# Patient Record
Sex: FEMALE | Race: WHITE | NOT HISPANIC OR LATINO | Employment: UNEMPLOYED | URBAN - METROPOLITAN AREA
[De-identification: names, ages, dates, MRNs, and addresses within clinical notes are randomized per-mention and may not be internally consistent; named-entity substitution may affect disease eponyms.]

---

## 2020-07-10 ENCOUNTER — OFFICE VISIT CONVERTED (OUTPATIENT)
Dept: FAMILY MEDICINE CLINIC | Facility: CLINIC | Age: 65
End: 2020-07-10
Attending: NURSE PRACTITIONER

## 2020-07-10 ENCOUNTER — CONVERSION ENCOUNTER (OUTPATIENT)
Dept: FAMILY MEDICINE CLINIC | Facility: CLINIC | Age: 65
End: 2020-07-10

## 2020-07-13 ENCOUNTER — HOSPITAL ENCOUNTER (OUTPATIENT)
Dept: PREADMISSION TESTING | Facility: HOSPITAL | Age: 65
Discharge: HOME | End: 2020-07-13
Attending: SURGERY

## 2020-07-13 ENCOUNTER — OFFICE VISIT CONVERTED (OUTPATIENT)
Dept: SURGERY | Facility: CLINIC | Age: 65
End: 2020-07-13
Attending: SURGERY

## 2020-07-14 ENCOUNTER — HOSPITAL ENCOUNTER (OUTPATIENT)
Dept: PERIOP | Facility: HOSPITAL | Age: 65
Setting detail: HOSPITAL OUTPATIENT SURGERY
Discharge: HOME | End: 2020-07-14
Attending: SURGERY

## 2020-07-14 LAB — SARS-COV-2 RNA SPEC QL NAA+PROBE: NOT DETECTED

## 2020-07-21 ENCOUNTER — OFFICE VISIT CONVERTED (OUTPATIENT)
Dept: ONCOLOGY | Facility: HOSPITAL | Age: 65
End: 2020-07-21
Attending: INTERNAL MEDICINE

## 2020-07-21 ENCOUNTER — HOSPITAL ENCOUNTER (OUTPATIENT)
Dept: ONCOLOGY | Facility: HOSPITAL | Age: 65
Discharge: HOME | End: 2020-07-21
Attending: INTERNAL MEDICINE

## 2020-07-22 ENCOUNTER — OFFICE VISIT CONVERTED (OUTPATIENT)
Dept: SURGERY | Facility: CLINIC | Age: 65
End: 2020-07-22
Attending: SURGERY

## 2020-07-24 ENCOUNTER — HOSPITAL ENCOUNTER (OUTPATIENT)
Dept: PREADMISSION TESTING | Facility: HOSPITAL | Age: 65
Discharge: HOME | End: 2020-07-24
Attending: SURGERY

## 2020-07-24 LAB
ALBUMIN SERPL-MCNC: 3.7 G/DL (ref 3.5–5)
ALBUMIN/GLOB SERPL: 1.3 {RATIO} (ref 1.4–2.6)
ALP SERPL-CCNC: 96 U/L (ref 43–160)
ALT SERPL-CCNC: 35 U/L (ref 10–40)
ANION GAP SERPL CALC-SCNC: 16 MMOL/L (ref 8–19)
AST SERPL-CCNC: 28 U/L (ref 15–50)
BASOPHILS # BLD AUTO: 0.08 10*3/UL (ref 0–0.2)
BASOPHILS NFR BLD AUTO: 0.9 % (ref 0–3)
BILIRUB SERPL-MCNC: 0.43 MG/DL (ref 0.2–1.3)
BUN SERPL-MCNC: 17 MG/DL (ref 5–25)
BUN/CREAT SERPL: 20 {RATIO} (ref 6–20)
CALCIUM SERPL-MCNC: 9.3 MG/DL (ref 8.7–10.4)
CHLORIDE SERPL-SCNC: 97 MMOL/L (ref 99–111)
CONV ABS IMM GRAN: 0.03 10*3/UL (ref 0–0.2)
CONV CO2: 25 MMOL/L (ref 22–32)
CONV IMMATURE GRAN: 0.4 % (ref 0–1.8)
CONV TOTAL PROTEIN: 6.5 G/DL (ref 6.3–8.2)
CREAT UR-MCNC: 0.84 MG/DL (ref 0.5–0.9)
DEPRECATED RDW RBC AUTO: 42.3 FL (ref 36.4–46.3)
EOSINOPHIL # BLD AUTO: 0.15 10*3/UL (ref 0–0.7)
EOSINOPHIL # BLD AUTO: 1.8 % (ref 0–7)
ERYTHROCYTE [DISTWIDTH] IN BLOOD BY AUTOMATED COUNT: 13.5 % (ref 11.7–14.4)
GFR SERPLBLD BASED ON 1.73 SQ M-ARVRAT: >60 ML/MIN/{1.73_M2}
GLOBULIN UR ELPH-MCNC: 2.8 G/DL (ref 2–3.5)
GLUCOSE SERPL-MCNC: 106 MG/DL (ref 65–99)
HCT VFR BLD AUTO: 36.9 % (ref 37–47)
HGB BLD-MCNC: 11.9 G/DL (ref 12–16)
LYMPHOCYTES # BLD AUTO: 1.03 10*3/UL (ref 1–5)
LYMPHOCYTES NFR BLD AUTO: 12.1 % (ref 20–45)
MCH RBC QN AUTO: 27.6 PG (ref 27–31)
MCHC RBC AUTO-ENTMCNC: 32.2 G/DL (ref 33–37)
MCV RBC AUTO: 85.6 FL (ref 81–99)
MONOCYTES # BLD AUTO: 0.79 10*3/UL (ref 0.2–1.2)
MONOCYTES NFR BLD AUTO: 9.3 % (ref 3–10)
NEUTROPHILS # BLD AUTO: 6.44 10*3/UL (ref 2–8)
NEUTROPHILS NFR BLD AUTO: 75.5 % (ref 30–85)
NRBC CBCN: 0 % (ref 0–0.7)
OSMOLALITY SERPL CALC.SUM OF ELEC: 280 MOSM/KG (ref 273–304)
PLATELET # BLD AUTO: 494 10*3/UL (ref 130–400)
PMV BLD AUTO: 8.8 FL (ref 9.4–12.3)
POTASSIUM SERPL-SCNC: 4.3 MMOL/L (ref 3.5–5.3)
RBC # BLD AUTO: 4.31 10*6/UL (ref 4.2–5.4)
SODIUM SERPL-SCNC: 134 MMOL/L (ref 135–147)
WBC # BLD AUTO: 8.52 10*3/UL (ref 4.8–10.8)

## 2020-07-26 LAB — SARS-COV-2 RNA SPEC QL NAA+PROBE: NOT DETECTED

## 2020-07-27 ENCOUNTER — HOSPITAL ENCOUNTER (OUTPATIENT)
Dept: RADIATION ONCOLOGY | Facility: HOSPITAL | Age: 65
Setting detail: RECURRING SERIES
Discharge: STILL ADMITTED OR ADMINISTRATIVE | End: 2020-07-27
Attending: RADIOLOGY

## 2020-08-06 ENCOUNTER — OFFICE VISIT CONVERTED (OUTPATIENT)
Dept: SURGERY | Facility: CLINIC | Age: 65
End: 2020-08-06
Attending: SURGERY

## 2020-08-07 ENCOUNTER — HOSPITAL ENCOUNTER (OUTPATIENT)
Dept: MRI IMAGING | Facility: HOSPITAL | Age: 65
Discharge: HOME | End: 2020-08-07
Attending: INTERNAL MEDICINE

## 2020-08-20 ENCOUNTER — OFFICE VISIT CONVERTED (OUTPATIENT)
Dept: ONCOLOGY | Facility: HOSPITAL | Age: 65
End: 2020-08-20
Attending: INTERNAL MEDICINE

## 2020-08-20 ENCOUNTER — HOSPITAL ENCOUNTER (OUTPATIENT)
Dept: OTHER | Facility: HOSPITAL | Age: 65
Discharge: HOME | End: 2020-08-20
Attending: INTERNAL MEDICINE

## 2020-08-26 ENCOUNTER — CONVERSION ENCOUNTER (OUTPATIENT)
Dept: SURGERY | Facility: CLINIC | Age: 65
End: 2020-08-26

## 2020-08-26 ENCOUNTER — OFFICE VISIT CONVERTED (OUTPATIENT)
Dept: SURGERY | Facility: CLINIC | Age: 65
End: 2020-08-26
Attending: SURGERY

## 2020-08-28 ENCOUNTER — HOSPITAL ENCOUNTER (OUTPATIENT)
Dept: ONCOLOGY | Facility: HOSPITAL | Age: 65
Discharge: HOME | End: 2020-08-28
Attending: NURSE PRACTITIONER

## 2020-08-28 ENCOUNTER — OFFICE VISIT CONVERTED (OUTPATIENT)
Dept: ONCOLOGY | Facility: HOSPITAL | Age: 65
End: 2020-08-28
Attending: NURSE PRACTITIONER

## 2020-08-31 ENCOUNTER — OFFICE VISIT CONVERTED (OUTPATIENT)
Dept: ONCOLOGY | Facility: HOSPITAL | Age: 65
End: 2020-08-31
Attending: INTERNAL MEDICINE

## 2020-08-31 ENCOUNTER — HOSPITAL ENCOUNTER (OUTPATIENT)
Dept: OTHER | Facility: HOSPITAL | Age: 65
Setting detail: RECURRING SERIES
Discharge: HOME | End: 2020-11-29
Attending: INTERNAL MEDICINE

## 2020-08-31 LAB
ALBUMIN SERPL-MCNC: 2.8 G/DL (ref 3.5–5)
ALBUMIN/GLOB SERPL: 1.2 {RATIO} (ref 1.4–2.6)
ALP SERPL-CCNC: 81 U/L (ref 43–160)
ALT SERPL-CCNC: 36 U/L (ref 10–40)
ANION GAP SERPL CALC-SCNC: 12 MMOL/L (ref 8–19)
AST SERPL-CCNC: 28 U/L (ref 15–50)
BASOPHILS # BLD AUTO: 0.04 10*3/UL (ref 0–0.2)
BASOPHILS NFR BLD AUTO: 0.3 % (ref 0–3)
BILIRUB SERPL-MCNC: 0.39 MG/DL (ref 0.2–1.3)
BUN SERPL-MCNC: 24 MG/DL (ref 5–25)
BUN/CREAT SERPL: 38 {RATIO} (ref 6–20)
CALCIUM SERPL-MCNC: 9.2 MG/DL (ref 8.7–10.4)
CHLORIDE SERPL-SCNC: 100 MMOL/L (ref 99–111)
CONV ABS IMM GRAN: 0.02 10*3/UL (ref 0–0.54)
CONV CO2: 26 MMOL/L (ref 22–32)
CONV EOSINOPHILS PERCENT BY MANUAL COUNT: 0.3 % (ref 0–7)
CONV IMMATURE GRAN: 0.2 % (ref 0–0.4)
CONV TOTAL PROTEIN: 5.1 G/DL (ref 6.3–8.2)
CREAT UR-MCNC: 0.63 MG/DL (ref 0.5–0.9)
EOSINOPHIL # BLD MANUAL: 0.04 10*3/UL (ref 0–0.7)
ERYTHROCYTE [DISTWIDTH] IN BLOOD BY AUTOMATED COUNT: 14.6 % (ref 11.5–14.5)
ERYTHROCYTE [DISTWIDTH] IN BLOOD BY AUTOMATED COUNT: 45 FL
FERRITIN SERPL-MCNC: 56 NG/ML (ref 10–200)
GFR SERPLBLD BASED ON 1.73 SQ M-ARVRAT: >60 ML/MIN/{1.73_M2}
GLOBULIN UR ELPH-MCNC: 2.3 G/DL (ref 2–3.5)
GLUCOSE SERPL-MCNC: 128 MG/DL (ref 65–99)
HBA1C MFR BLD: 9.5 G/DL (ref 12–16)
HCT VFR BLD AUTO: 29.1 % (ref 37–47)
IRON SATN MFR SERPL: 8 % (ref 20–55)
IRON SERPL-MCNC: 24 UG/DL (ref 60–170)
LYMPHOCYTES # BLD AUTO: 1.05 10*3/UL (ref 1–5)
LYMPHOCYTES NFR BLD AUTO: 8.7 % (ref 20–45)
MAGNESIUM SERPL-MCNC: 2.17 MG/DL (ref 1.6–2.3)
MCH RBC QN AUTO: 27.6 PG (ref 27–31)
MCHC RBC AUTO-ENTMCNC: 32.6 G/DL (ref 33–37)
MCV RBC AUTO: 84.6 FL (ref 81–99)
MONOCYTES # BLD AUTO: 0.91 10*3/UL (ref 0.2–1.2)
MONOCYTES NFR BLD MANUAL: 7.6 % (ref 3–10)
NEUTROPHILS # BLD AUTO: 9.96 10*3/UL (ref 2–8)
NEUTROPHILS NFR BLD MANUAL: 82.9 % (ref 30–85)
OSMOLALITY SERPL CALC.SUM OF ELEC: 284 MOSM/KG (ref 273–304)
PLATELET # BLD AUTO: 565 10*3/UL (ref 130–400)
PMV BLD AUTO: 8.1 FL (ref 7.4–10.4)
POTASSIUM SERPL-SCNC: 4 MMOL/L (ref 3.5–5.3)
RBC MORPH BLD: 3.44 10*6/UL (ref 4.2–5.4)
SODIUM SERPL-SCNC: 134 MMOL/L (ref 135–147)
TIBC SERPL-MCNC: 283 UG/DL (ref 245–450)
TRANSFERRIN SERPL-MCNC: 198 MG/DL (ref 250–380)
WBC # BLD AUTO: 12.02 10*3/UL (ref 4.8–10.8)

## 2020-09-14 ENCOUNTER — OFFICE VISIT CONVERTED (OUTPATIENT)
Dept: ONCOLOGY | Facility: HOSPITAL | Age: 65
End: 2020-09-14
Attending: INTERNAL MEDICINE

## 2020-09-14 LAB
ALBUMIN SERPL-MCNC: 2.7 G/DL (ref 3.5–5)
ALBUMIN/GLOB SERPL: 1.4 {RATIO} (ref 1.4–2.6)
ALP SERPL-CCNC: 126 U/L (ref 43–160)
ALT SERPL-CCNC: 22 U/L (ref 10–40)
ANION GAP SERPL CALC-SCNC: 8 MMOL/L (ref 8–19)
AST SERPL-CCNC: 19 U/L (ref 15–50)
BASOPHILS # BLD AUTO: 0.1 10*3/UL (ref 0–0.2)
BASOPHILS NFR BLD AUTO: 0.5 % (ref 0–3)
BILIRUB SERPL-MCNC: <0.15 MG/DL (ref 0.2–1.3)
BUN SERPL-MCNC: 17 MG/DL (ref 5–25)
BUN/CREAT SERPL: 31 {RATIO} (ref 6–20)
CALCIUM SERPL-MCNC: 8.6 MG/DL (ref 8.7–10.4)
CHLORIDE SERPL-SCNC: 106 MMOL/L (ref 99–111)
CONV ABS IMM GRAN: 1.29 10*3/UL (ref 0–0.54)
CONV CO2: 27 MMOL/L (ref 22–32)
CONV EOSINOPHILS PERCENT BY MANUAL COUNT: 0.2 % (ref 0–7)
CONV IMMATURE GRAN: 6.6 % (ref 0–0.4)
CONV TOTAL PROTEIN: 4.7 G/DL (ref 6.3–8.2)
CREAT UR-MCNC: 0.55 MG/DL (ref 0.5–0.9)
EOSINOPHIL # BLD MANUAL: 0.03 10*3/UL (ref 0–0.7)
ERYTHROCYTE [DISTWIDTH] IN BLOOD BY AUTOMATED COUNT: 20.3 % (ref 11.5–14.5)
ERYTHROCYTE [DISTWIDTH] IN BLOOD BY AUTOMATED COUNT: 53.6 FL
GFR SERPLBLD BASED ON 1.73 SQ M-ARVRAT: >60 ML/MIN/{1.73_M2}
GLOBULIN UR ELPH-MCNC: 2 G/DL (ref 2–3.5)
GLUCOSE SERPL-MCNC: 99 MG/DL (ref 65–99)
HBA1C MFR BLD: 9.7 G/DL (ref 12–16)
HCT VFR BLD AUTO: 30.6 % (ref 37–47)
LYMPHOCYTES # BLD AUTO: 1.79 10*3/UL (ref 1–5)
LYMPHOCYTES NFR BLD AUTO: 9.2 % (ref 20–45)
MAGNESIUM SERPL-MCNC: 2.17 MG/DL (ref 1.6–2.3)
MCH RBC QN AUTO: 28.9 PG (ref 27–31)
MCHC RBC AUTO-ENTMCNC: 31.7 G/DL (ref 33–37)
MCV RBC AUTO: 91.1 FL (ref 81–99)
MONOCYTES # BLD AUTO: 0.82 10*3/UL (ref 0.2–1.2)
MONOCYTES NFR BLD MANUAL: 4.2 % (ref 3–10)
NEUTROPHILS # BLD AUTO: 15.39 10*3/UL (ref 2–8)
NEUTROPHILS NFR BLD MANUAL: 79.3 % (ref 30–85)
OSMOLALITY SERPL CALC.SUM OF ELEC: 286 MOSM/KG (ref 273–304)
PATHOLOGY REVIEW: NORMAL
PLATELET # BLD AUTO: 348 10*3/UL (ref 130–400)
PMV BLD AUTO: 8.5 FL (ref 7.4–10.4)
POTASSIUM SERPL-SCNC: 3.8 MMOL/L (ref 3.5–5.3)
RBC MORPH BLD: 3.36 10*6/UL (ref 4.2–5.4)
SODIUM SERPL-SCNC: 137 MMOL/L (ref 135–147)
WBC # BLD AUTO: 19.42 10*3/UL (ref 4.8–10.8)

## 2020-09-28 ENCOUNTER — OFFICE VISIT CONVERTED (OUTPATIENT)
Dept: ONCOLOGY | Facility: HOSPITAL | Age: 65
End: 2020-09-28
Attending: INTERNAL MEDICINE

## 2020-09-28 LAB
ALBUMIN SERPL-MCNC: 3.3 G/DL (ref 3.5–5)
ALBUMIN/GLOB SERPL: 1.6 {RATIO} (ref 1.4–2.6)
ALP SERPL-CCNC: 143 U/L (ref 43–160)
ALT SERPL-CCNC: 38 U/L (ref 10–40)
ANION GAP SERPL CALC-SCNC: 13 MMOL/L (ref 8–19)
AST SERPL-CCNC: 32 U/L (ref 15–50)
BASOPHILS # BLD AUTO: 0.1 10*3/UL (ref 0–0.2)
BASOPHILS NFR BLD AUTO: 0.7 % (ref 0–3)
BILIRUB SERPL-MCNC: <0.15 MG/DL (ref 0.2–1.3)
BUN SERPL-MCNC: 14 MG/DL (ref 5–25)
BUN/CREAT SERPL: 23 {RATIO} (ref 6–20)
CALCIUM SERPL-MCNC: 9 MG/DL (ref 8.7–10.4)
CHLORIDE SERPL-SCNC: 105 MMOL/L (ref 99–111)
CONV ABS IMM GRAN: 0.77 10*3/UL (ref 0–0.54)
CONV CO2: 25 MMOL/L (ref 22–32)
CONV EOSINOPHILS PERCENT BY MANUAL COUNT: 0.3 % (ref 0–7)
CONV IMMATURE GRAN: 5.6 % (ref 0–0.4)
CONV TOTAL PROTEIN: 5.4 G/DL (ref 6.3–8.2)
CREAT UR-MCNC: 0.61 MG/DL (ref 0.5–0.9)
EOSINOPHIL # BLD MANUAL: 0.04 10*3/UL (ref 0–0.7)
ERYTHROCYTE [DISTWIDTH] IN BLOOD BY AUTOMATED COUNT: 23.6 % (ref 11.5–14.5)
ERYTHROCYTE [DISTWIDTH] IN BLOOD BY AUTOMATED COUNT: 77.6 FL
GFR SERPLBLD BASED ON 1.73 SQ M-ARVRAT: >60 ML/MIN/{1.73_M2}
GLOBULIN UR ELPH-MCNC: 2.1 G/DL (ref 2–3.5)
GLUCOSE SERPL-MCNC: 109 MG/DL (ref 65–99)
HBA1C MFR BLD: 10.6 G/DL (ref 12–16)
HCT VFR BLD AUTO: 34.8 % (ref 37–47)
LYMPHOCYTES # BLD AUTO: 1.4 10*3/UL (ref 1–5)
LYMPHOCYTES NFR BLD AUTO: 10.2 % (ref 20–45)
MAGNESIUM SERPL-MCNC: 2.31 MG/DL (ref 1.6–2.3)
MCH RBC QN AUTO: 29.3 PG (ref 27–31)
MCHC RBC AUTO-ENTMCNC: 30.5 G/DL (ref 33–37)
MCV RBC AUTO: 96.1 FL (ref 81–99)
MONOCYTES # BLD AUTO: 0.66 10*3/UL (ref 0.2–1.2)
MONOCYTES NFR BLD MANUAL: 4.8 % (ref 3–10)
NEUTROPHILS # BLD AUTO: 10.76 10*3/UL (ref 2–8)
NEUTROPHILS NFR BLD MANUAL: 78.4 % (ref 30–85)
OSMOLALITY SERPL CALC.SUM OF ELEC: 289 MOSM/KG (ref 273–304)
PLATELET # BLD AUTO: 302 10*3/UL (ref 130–400)
PMV BLD AUTO: 8.8 FL (ref 7.4–10.4)
POTASSIUM SERPL-SCNC: 4 MMOL/L (ref 3.5–5.3)
RBC MORPH BLD: 3.62 10*6/UL (ref 4.2–5.4)
SODIUM SERPL-SCNC: 139 MMOL/L (ref 135–147)
WBC # BLD AUTO: 13.73 10*3/UL (ref 4.8–10.8)

## 2020-10-12 ENCOUNTER — OFFICE VISIT CONVERTED (OUTPATIENT)
Dept: ONCOLOGY | Facility: HOSPITAL | Age: 65
End: 2020-10-12
Attending: INTERNAL MEDICINE

## 2020-10-12 LAB
ALBUMIN SERPL-MCNC: 3.4 G/DL (ref 3.5–5)
ALBUMIN/GLOB SERPL: 1.6 {RATIO} (ref 1.4–2.6)
ALP SERPL-CCNC: 132 U/L (ref 43–160)
ALT SERPL-CCNC: 45 U/L (ref 10–40)
ANION GAP SERPL CALC-SCNC: 12 MMOL/L (ref 8–19)
AST SERPL-CCNC: 41 U/L (ref 15–50)
BASOPHILS # BLD AUTO: 0.05 10*3/UL (ref 0–0.2)
BASOPHILS NFR BLD AUTO: 0.4 % (ref 0–3)
BILIRUB SERPL-MCNC: <0.15 MG/DL (ref 0.2–1.3)
BUN SERPL-MCNC: 17 MG/DL (ref 5–25)
BUN/CREAT SERPL: 31 {RATIO} (ref 6–20)
CALCIUM SERPL-MCNC: 9.5 MG/DL (ref 8.7–10.4)
CHLORIDE SERPL-SCNC: 106 MMOL/L (ref 99–111)
CONV ABS IMM GRAN: 0.24 10*3/UL (ref 0–0.54)
CONV CO2: 27 MMOL/L (ref 22–32)
CONV EOSINOPHILS PERCENT BY MANUAL COUNT: 0.6 % (ref 0–7)
CONV IMMATURE GRAN: 1.9 % (ref 0–0.4)
CONV TOTAL PROTEIN: 5.5 G/DL (ref 6.3–8.2)
CREAT UR-MCNC: 0.54 MG/DL (ref 0.5–0.9)
EOSINOPHIL # BLD MANUAL: 0.08 10*3/UL (ref 0–0.7)
ERYTHROCYTE [DISTWIDTH] IN BLOOD BY AUTOMATED COUNT: 22.7 % (ref 11.5–14.5)
ERYTHROCYTE [DISTWIDTH] IN BLOOD BY AUTOMATED COUNT: 77.1 FL
GFR SERPLBLD BASED ON 1.73 SQ M-ARVRAT: >60 ML/MIN/{1.73_M2}
GLOBULIN UR ELPH-MCNC: 2.1 G/DL (ref 2–3.5)
GLUCOSE SERPL-MCNC: 105 MG/DL (ref 65–99)
HBA1C MFR BLD: 11.2 G/DL (ref 12–16)
HCT VFR BLD AUTO: 35.2 % (ref 37–47)
LYMPHOCYTES # BLD AUTO: 1.24 10*3/UL (ref 1–5)
LYMPHOCYTES NFR BLD AUTO: 9.6 % (ref 20–45)
MAGNESIUM SERPL-MCNC: 2.17 MG/DL (ref 1.6–2.3)
MCH RBC QN AUTO: 31.2 PG (ref 27–31)
MCHC RBC AUTO-ENTMCNC: 31.8 G/DL (ref 33–37)
MCV RBC AUTO: 98.1 FL (ref 81–99)
MONOCYTES # BLD AUTO: 0.73 10*3/UL (ref 0.2–1.2)
MONOCYTES NFR BLD MANUAL: 5.7 % (ref 3–10)
NEUTROPHILS # BLD AUTO: 10.51 10*3/UL (ref 2–8)
NEUTROPHILS NFR BLD MANUAL: 81.8 % (ref 30–85)
OSMOLALITY SERPL CALC.SUM OF ELEC: 294 MOSM/KG (ref 273–304)
PLATELET # BLD AUTO: 267 10*3/UL (ref 130–400)
PMV BLD AUTO: 9.6 FL (ref 7.4–10.4)
POTASSIUM SERPL-SCNC: 3.7 MMOL/L (ref 3.5–5.3)
RBC MORPH BLD: 3.59 10*6/UL (ref 4.2–5.4)
SODIUM SERPL-SCNC: 141 MMOL/L (ref 135–147)
WBC # BLD AUTO: 12.85 10*3/UL (ref 4.8–10.8)

## 2020-10-26 ENCOUNTER — OFFICE VISIT CONVERTED (OUTPATIENT)
Dept: ONCOLOGY | Facility: HOSPITAL | Age: 65
End: 2020-10-26
Attending: INTERNAL MEDICINE

## 2020-10-26 LAB
ALBUMIN SERPL-MCNC: 3.8 G/DL (ref 3.5–5)
ALBUMIN/GLOB SERPL: 1.7 {RATIO} (ref 1.4–2.6)
ALP SERPL-CCNC: 158 U/L (ref 43–160)
ALT SERPL-CCNC: 94 U/L (ref 10–40)
ANION GAP SERPL CALC-SCNC: 14 MMOL/L (ref 8–19)
AST SERPL-CCNC: 92 U/L (ref 15–50)
BASOPHILS # BLD AUTO: 0.04 10*3/UL (ref 0–0.2)
BASOPHILS NFR BLD AUTO: 0.3 % (ref 0–3)
BILIRUB SERPL-MCNC: 0.18 MG/DL (ref 0.2–1.3)
BUN SERPL-MCNC: 19 MG/DL (ref 5–25)
BUN/CREAT SERPL: 35 {RATIO} (ref 6–20)
CALCIUM SERPL-MCNC: 9.5 MG/DL (ref 8.7–10.4)
CHLORIDE SERPL-SCNC: 103 MMOL/L (ref 99–111)
CONV ABS IMM GRAN: 0.58 10*3/UL (ref 0–0.54)
CONV CO2: 29 MMOL/L (ref 22–32)
CONV EOSINOPHILS PERCENT BY MANUAL COUNT: 0.9 % (ref 0–7)
CONV IMMATURE GRAN: 3.9 % (ref 0–0.4)
CONV TOTAL PROTEIN: 6 G/DL (ref 6.3–8.2)
CREAT UR-MCNC: 0.54 MG/DL (ref 0.5–0.9)
EOSINOPHIL # BLD MANUAL: 0.14 10*3/UL (ref 0–0.7)
ERYTHROCYTE [DISTWIDTH] IN BLOOD BY AUTOMATED COUNT: 20.9 % (ref 11.5–14.5)
ERYTHROCYTE [DISTWIDTH] IN BLOOD BY AUTOMATED COUNT: 73.8 FL
FERRITIN SERPL-MCNC: 849 NG/ML (ref 10–200)
GFR SERPLBLD BASED ON 1.73 SQ M-ARVRAT: >60 ML/MIN/{1.73_M2}
GLOBULIN UR ELPH-MCNC: 2.2 G/DL (ref 2–3.5)
GLUCOSE SERPL-MCNC: 103 MG/DL (ref 65–99)
HBA1C MFR BLD: 12 G/DL (ref 12–16)
HCT VFR BLD AUTO: 37.7 % (ref 37–47)
IRON SATN MFR SERPL: 21 % (ref 20–55)
IRON SERPL-MCNC: 66 UG/DL (ref 60–170)
LYMPHOCYTES # BLD AUTO: 1.47 10*3/UL (ref 1–5)
LYMPHOCYTES NFR BLD AUTO: 9.8 % (ref 20–45)
MAGNESIUM SERPL-MCNC: 2.26 MG/DL (ref 1.6–2.3)
MCH RBC QN AUTO: 31.4 PG (ref 27–31)
MCHC RBC AUTO-ENTMCNC: 31.8 G/DL (ref 33–37)
MCV RBC AUTO: 98.7 FL (ref 81–99)
MONOCYTES # BLD AUTO: 0.87 10*3/UL (ref 0.2–1.2)
MONOCYTES NFR BLD MANUAL: 5.8 % (ref 3–10)
NEUTROPHILS # BLD AUTO: 11.92 10*3/UL (ref 2–8)
NEUTROPHILS NFR BLD MANUAL: 79.3 % (ref 30–85)
OSMOLALITY SERPL CALC.SUM OF ELEC: 297 MOSM/KG (ref 273–304)
PLATELET # BLD AUTO: 202 10*3/UL (ref 130–400)
PMV BLD AUTO: 10 FL (ref 7.4–10.4)
POTASSIUM SERPL-SCNC: 3.8 MMOL/L (ref 3.5–5.3)
RBC MORPH BLD: 3.82 10*6/UL (ref 4.2–5.4)
SODIUM SERPL-SCNC: 142 MMOL/L (ref 135–147)
TIBC SERPL-MCNC: 320 UG/DL (ref 245–450)
TRANSFERRIN SERPL-MCNC: 224 MG/DL (ref 250–380)
WBC # BLD AUTO: 15.02 10*3/UL (ref 4.8–10.8)

## 2020-11-02 ENCOUNTER — HOSPITAL ENCOUNTER (OUTPATIENT)
Dept: GENERAL RADIOLOGY | Facility: HOSPITAL | Age: 65
Discharge: HOME | End: 2020-11-02
Attending: INTERNAL MEDICINE

## 2020-11-09 ENCOUNTER — OFFICE VISIT CONVERTED (OUTPATIENT)
Dept: ONCOLOGY | Facility: HOSPITAL | Age: 65
End: 2020-11-09
Attending: INTERNAL MEDICINE

## 2020-11-09 LAB
ALBUMIN SERPL-MCNC: 3.9 G/DL (ref 3.5–5)
ALBUMIN/GLOB SERPL: 1.9 {RATIO} (ref 1.4–2.6)
ALP SERPL-CCNC: 169 U/L (ref 43–160)
ALT SERPL-CCNC: 74 U/L (ref 10–40)
ANION GAP SERPL CALC-SCNC: 15 MMOL/L (ref 8–19)
AST SERPL-CCNC: 69 U/L (ref 15–50)
BASOPHILS # BLD AUTO: 0.05 10*3/UL (ref 0–0.2)
BASOPHILS NFR BLD AUTO: 0.4 % (ref 0–3)
BILIRUB SERPL-MCNC: 0.23 MG/DL (ref 0.2–1.3)
BUN SERPL-MCNC: 19 MG/DL (ref 5–25)
BUN/CREAT SERPL: 29 {RATIO} (ref 6–20)
CALCIUM SERPL-MCNC: 9.4 MG/DL (ref 8.7–10.4)
CHLORIDE SERPL-SCNC: 101 MMOL/L (ref 99–111)
CONV ABS IMM GRAN: 0.67 10*3/UL (ref 0–0.54)
CONV CO2: 29 MMOL/L (ref 22–32)
CONV EOSINOPHILS PERCENT BY MANUAL COUNT: 0.8 % (ref 0–7)
CONV IMMATURE GRAN: 5.3 % (ref 0–0.4)
CONV TOTAL PROTEIN: 6 G/DL (ref 6.3–8.2)
CREAT UR-MCNC: 0.66 MG/DL (ref 0.5–0.9)
EOSINOPHIL # BLD MANUAL: 0.1 10*3/UL (ref 0–0.7)
ERYTHROCYTE [DISTWIDTH] IN BLOOD BY AUTOMATED COUNT: 19.2 % (ref 11.5–14.5)
ERYTHROCYTE [DISTWIDTH] IN BLOOD BY AUTOMATED COUNT: 68.4 FL
GFR SERPLBLD BASED ON 1.73 SQ M-ARVRAT: >60 ML/MIN/{1.73_M2}
GLOBULIN UR ELPH-MCNC: 2.1 G/DL (ref 2–3.5)
GLUCOSE SERPL-MCNC: 93 MG/DL (ref 65–99)
HBA1C MFR BLD: 12.7 G/DL (ref 12–16)
HCT VFR BLD AUTO: 39.5 % (ref 37–47)
LYMPHOCYTES # BLD AUTO: 1.27 10*3/UL (ref 1–5)
LYMPHOCYTES NFR BLD AUTO: 10 % (ref 20–45)
MAGNESIUM SERPL-MCNC: 2.3 MG/DL (ref 1.6–2.3)
MCH RBC QN AUTO: 31.7 PG (ref 27–31)
MCHC RBC AUTO-ENTMCNC: 32.2 G/DL (ref 33–37)
MCV RBC AUTO: 98.5 FL (ref 81–99)
MONOCYTES # BLD AUTO: 0.87 10*3/UL (ref 0.2–1.2)
MONOCYTES NFR BLD MANUAL: 6.8 % (ref 3–10)
NEUTROPHILS # BLD AUTO: 9.77 10*3/UL (ref 2–8)
NEUTROPHILS NFR BLD MANUAL: 76.7 % (ref 30–85)
OSMOLALITY SERPL CALC.SUM OF ELEC: 294 MOSM/KG (ref 273–304)
PLATELET # BLD AUTO: 181 10*3/UL (ref 130–400)
PMV BLD AUTO: 10.1 FL (ref 7.4–10.4)
POTASSIUM SERPL-SCNC: 4 MMOL/L (ref 3.5–5.3)
RBC MORPH BLD: 4.01 10*6/UL (ref 4.2–5.4)
SODIUM SERPL-SCNC: 141 MMOL/L (ref 135–147)
WBC # BLD AUTO: 12.73 10*3/UL (ref 4.8–10.8)

## 2020-11-23 ENCOUNTER — OFFICE VISIT CONVERTED (OUTPATIENT)
Dept: ONCOLOGY | Facility: HOSPITAL | Age: 65
End: 2020-11-23
Attending: INTERNAL MEDICINE

## 2020-11-23 LAB
ALBUMIN SERPL-MCNC: 3.9 G/DL (ref 3.5–5)
ALBUMIN/GLOB SERPL: 1.8 {RATIO} (ref 1.4–2.6)
ALP SERPL-CCNC: 282 U/L (ref 43–160)
ALT SERPL-CCNC: 387 U/L (ref 10–40)
ANION GAP SERPL CALC-SCNC: 11 MMOL/L (ref 8–19)
AST SERPL-CCNC: 211 U/L (ref 15–50)
BASOPHILS # BLD AUTO: 0.03 10*3/UL (ref 0–0.2)
BASOPHILS NFR BLD AUTO: 0.2 % (ref 0–3)
BILIRUB SERPL-MCNC: 0.27 MG/DL (ref 0.2–1.3)
BUN SERPL-MCNC: 16 MG/DL (ref 5–25)
BUN/CREAT SERPL: 26 {RATIO} (ref 6–20)
CALCIUM SERPL-MCNC: 9.4 MG/DL (ref 8.7–10.4)
CHLORIDE SERPL-SCNC: 104 MMOL/L (ref 99–111)
CONV ABS IMM GRAN: 0.72 10*3/UL (ref 0–0.54)
CONV CO2: 30 MMOL/L (ref 22–32)
CONV EOSINOPHILS PERCENT BY MANUAL COUNT: 0.9 % (ref 0–7)
CONV IMMATURE GRAN: 4.9 % (ref 0–0.4)
CONV TOTAL PROTEIN: 6.1 G/DL (ref 6.3–8.2)
CREAT UR-MCNC: 0.62 MG/DL (ref 0.5–0.9)
EOSINOPHIL # BLD MANUAL: 0.13 10*3/UL (ref 0–0.7)
ERYTHROCYTE [DISTWIDTH] IN BLOOD BY AUTOMATED COUNT: 17.9 % (ref 11.5–14.5)
ERYTHROCYTE [DISTWIDTH] IN BLOOD BY AUTOMATED COUNT: 65 FL
GFR SERPLBLD BASED ON 1.73 SQ M-ARVRAT: >60 ML/MIN/{1.73_M2}
GLOBULIN UR ELPH-MCNC: 2.2 G/DL (ref 2–3.5)
GLUCOSE SERPL-MCNC: 94 MG/DL (ref 65–99)
HBA1C MFR BLD: 12.5 G/DL (ref 12–16)
HCT VFR BLD AUTO: 39 % (ref 37–47)
LYMPHOCYTES # BLD AUTO: 1.61 10*3/UL (ref 1–5)
LYMPHOCYTES NFR BLD AUTO: 10.9 % (ref 20–45)
MAGNESIUM SERPL-MCNC: 2.25 MG/DL (ref 1.6–2.3)
MCH RBC QN AUTO: 31.6 PG (ref 27–31)
MCHC RBC AUTO-ENTMCNC: 32.1 G/DL (ref 33–37)
MCV RBC AUTO: 98.7 FL (ref 81–99)
MONOCYTES # BLD AUTO: 0.99 10*3/UL (ref 0.2–1.2)
MONOCYTES NFR BLD MANUAL: 6.7 % (ref 3–10)
NEUTROPHILS # BLD AUTO: 11.3 10*3/UL (ref 2–8)
NEUTROPHILS NFR BLD MANUAL: 76.4 % (ref 30–85)
OSMOLALITY SERPL CALC.SUM OF ELEC: 293 MOSM/KG (ref 273–304)
PLATELET # BLD AUTO: 117 10*3/UL (ref 130–400)
PMV BLD AUTO: 10.5 FL (ref 7.4–10.4)
POTASSIUM SERPL-SCNC: 4 MMOL/L (ref 3.5–5.3)
RBC MORPH BLD: 3.95 10*6/UL (ref 4.2–5.4)
SODIUM SERPL-SCNC: 141 MMOL/L (ref 135–147)
WBC # BLD AUTO: 14.78 10*3/UL (ref 4.8–10.8)

## 2020-11-30 ENCOUNTER — OFFICE VISIT CONVERTED (OUTPATIENT)
Dept: SURGERY | Facility: CLINIC | Age: 65
End: 2020-11-30
Attending: SURGERY

## 2020-12-03 ENCOUNTER — HOSPITAL ENCOUNTER (OUTPATIENT)
Dept: RADIATION ONCOLOGY | Facility: HOSPITAL | Age: 65
Setting detail: RECURRING SERIES
Discharge: STILL ADMITTED OR ADMINISTRATIVE | End: 2020-12-31
Attending: RADIOLOGY

## 2020-12-07 ENCOUNTER — OFFICE VISIT CONVERTED (OUTPATIENT)
Dept: ONCOLOGY | Facility: HOSPITAL | Age: 65
End: 2020-12-07
Attending: INTERNAL MEDICINE

## 2020-12-07 ENCOUNTER — HOSPITAL ENCOUNTER (OUTPATIENT)
Dept: OTHER | Facility: HOSPITAL | Age: 65
Setting detail: RECURRING SERIES
Discharge: HOME | End: 2021-03-07
Attending: INTERNAL MEDICINE

## 2020-12-07 LAB
ALBUMIN SERPL-MCNC: 4 G/DL (ref 3.5–5)
ALBUMIN/GLOB SERPL: 2 {RATIO} (ref 1.4–2.6)
ALP SERPL-CCNC: 117 U/L (ref 43–160)
ALT SERPL-CCNC: 65 U/L (ref 10–40)
ANION GAP SERPL CALC-SCNC: 12 MMOL/L (ref 8–19)
AST SERPL-CCNC: 47 U/L (ref 15–50)
BASOPHILS # BLD AUTO: 0.04 10*3/UL (ref 0–0.2)
BASOPHILS NFR BLD AUTO: 0.4 % (ref 0–3)
BILIRUB SERPL-MCNC: 0.34 MG/DL (ref 0.2–1.3)
BUN SERPL-MCNC: 18 MG/DL (ref 5–25)
BUN/CREAT SERPL: 31 {RATIO} (ref 6–20)
CALCIUM SERPL-MCNC: 9.1 MG/DL (ref 8.7–10.4)
CHLORIDE SERPL-SCNC: 106 MMOL/L (ref 99–111)
CONV ABS IMM GRAN: 0.02 10*3/UL (ref 0–0.54)
CONV CO2: 26 MMOL/L (ref 22–32)
CONV EOSINOPHILS PERCENT BY MANUAL COUNT: 0.3 % (ref 0–7)
CONV IMMATURE GRAN: 0.2 % (ref 0–0.4)
CONV TOTAL PROTEIN: 6 G/DL (ref 6.3–8.2)
CREAT UR-MCNC: 0.58 MG/DL (ref 0.5–0.9)
EOSINOPHIL # BLD MANUAL: 0.03 10*3/UL (ref 0–0.7)
ERYTHROCYTE [DISTWIDTH] IN BLOOD BY AUTOMATED COUNT: 14.9 % (ref 11.5–14.5)
ERYTHROCYTE [DISTWIDTH] IN BLOOD BY AUTOMATED COUNT: 55.2 FL
GFR SERPLBLD BASED ON 1.73 SQ M-ARVRAT: >60 ML/MIN/{1.73_M2}
GLOBULIN UR ELPH-MCNC: 2 G/DL (ref 2–3.5)
GLUCOSE SERPL-MCNC: 96 MG/DL (ref 65–99)
HBA1C MFR BLD: 11.6 G/DL (ref 12–16)
HCT VFR BLD AUTO: 35.7 % (ref 37–47)
LYMPHOCYTES # BLD AUTO: 0.92 10*3/UL (ref 1–5)
LYMPHOCYTES NFR BLD AUTO: 8.1 % (ref 20–45)
MAGNESIUM SERPL-MCNC: 2.12 MG/DL (ref 1.6–2.3)
MCH RBC QN AUTO: 32.6 PG (ref 27–31)
MCHC RBC AUTO-ENTMCNC: 32.5 G/DL (ref 33–37)
MCV RBC AUTO: 100.3 FL (ref 81–99)
MONOCYTES # BLD AUTO: 0.81 10*3/UL (ref 0.2–1.2)
MONOCYTES NFR BLD MANUAL: 7.2 % (ref 3–10)
NEUTROPHILS # BLD AUTO: 9.48 10*3/UL (ref 2–8)
NEUTROPHILS NFR BLD MANUAL: 83.8 % (ref 30–85)
OSMOLALITY SERPL CALC.SUM OF ELEC: 292 MOSM/KG (ref 273–304)
PLATELET # BLD AUTO: 207 10*3/UL (ref 130–400)
PMV BLD AUTO: 9.6 FL (ref 7.4–10.4)
POTASSIUM SERPL-SCNC: 3.7 MMOL/L (ref 3.5–5.3)
RBC MORPH BLD: 3.56 10*6/UL (ref 4.2–5.4)
SODIUM SERPL-SCNC: 140 MMOL/L (ref 135–147)
WBC # BLD AUTO: 11.3 10*3/UL (ref 4.8–10.8)

## 2020-12-21 ENCOUNTER — OFFICE VISIT CONVERTED (OUTPATIENT)
Dept: ONCOLOGY | Facility: HOSPITAL | Age: 65
End: 2020-12-21
Attending: INTERNAL MEDICINE

## 2020-12-21 LAB
ALBUMIN SERPL-MCNC: 4.4 G/DL (ref 3.5–5)
ALBUMIN/GLOB SERPL: 1.8 {RATIO} (ref 1.4–2.6)
ALP SERPL-CCNC: 165 U/L (ref 43–160)
ALT SERPL-CCNC: 58 U/L (ref 10–40)
ANION GAP SERPL CALC-SCNC: 13 MMOL/L (ref 8–19)
AST SERPL-CCNC: 30 U/L (ref 15–50)
BASOPHILS # BLD AUTO: 0.02 10*3/UL (ref 0–0.2)
BASOPHILS NFR BLD AUTO: 0.1 % (ref 0–3)
BILIRUB SERPL-MCNC: 0.41 MG/DL (ref 0.2–1.3)
BUN SERPL-MCNC: 27 MG/DL (ref 5–25)
BUN/CREAT SERPL: 42 {RATIO} (ref 6–20)
CALCIUM SERPL-MCNC: 9.3 MG/DL (ref 8.7–10.4)
CHLORIDE SERPL-SCNC: 105 MMOL/L (ref 99–111)
CONV ABS IMM GRAN: 0.26 10*3/UL (ref 0–0.54)
CONV CO2: 27 MMOL/L (ref 22–32)
CONV EOSINOPHILS PERCENT BY MANUAL COUNT: 0 % (ref 0–7)
CONV IMMATURE GRAN: 0.9 % (ref 0–0.4)
CONV TOTAL PROTEIN: 6.9 G/DL (ref 6.3–8.2)
CREAT UR-MCNC: 0.64 MG/DL (ref 0.5–0.9)
EOSINOPHIL # BLD MANUAL: 0.01 10*3/UL (ref 0–0.7)
ERYTHROCYTE [DISTWIDTH] IN BLOOD BY AUTOMATED COUNT: 14.7 % (ref 11.5–14.5)
ERYTHROCYTE [DISTWIDTH] IN BLOOD BY AUTOMATED COUNT: 53.5 FL
GFR SERPLBLD BASED ON 1.73 SQ M-ARVRAT: >60 ML/MIN/{1.73_M2}
GLOBULIN UR ELPH-MCNC: 2.5 G/DL (ref 2–3.5)
GLUCOSE SERPL-MCNC: 87 MG/DL (ref 65–99)
HBA1C MFR BLD: 13.6 G/DL (ref 12–16)
HCT VFR BLD AUTO: 42.1 % (ref 37–47)
LYMPHOCYTES # BLD AUTO: 1.44 10*3/UL (ref 1–5)
LYMPHOCYTES NFR BLD AUTO: 5 % (ref 20–45)
MAGNESIUM SERPL-MCNC: 2.3 MG/DL (ref 1.6–2.3)
MCH RBC QN AUTO: 32.4 PG (ref 27–31)
MCHC RBC AUTO-ENTMCNC: 32.3 G/DL (ref 33–37)
MCV RBC AUTO: 100.2 FL (ref 81–99)
MONOCYTES # BLD AUTO: 1.03 10*3/UL (ref 0.2–1.2)
MONOCYTES NFR BLD MANUAL: 3.6 % (ref 3–10)
NEUTROPHILS # BLD AUTO: 25.76 10*3/UL (ref 2–8)
NEUTROPHILS NFR BLD MANUAL: 90.4 % (ref 30–85)
OSMOLALITY SERPL CALC.SUM OF ELEC: 296 MOSM/KG (ref 273–304)
PLATELET # BLD AUTO: 194 10*3/UL (ref 130–400)
PMV BLD AUTO: 9.5 FL (ref 7.4–10.4)
POTASSIUM SERPL-SCNC: 4.4 MMOL/L (ref 3.5–5.3)
RBC MORPH BLD: 4.2 10*6/UL (ref 4.2–5.4)
SODIUM SERPL-SCNC: 141 MMOL/L (ref 135–147)
WBC # BLD AUTO: 28.52 10*3/UL (ref 4.8–10.8)

## 2021-01-04 ENCOUNTER — HOSPITAL ENCOUNTER (OUTPATIENT)
Dept: RADIATION ONCOLOGY | Facility: HOSPITAL | Age: 66
Setting detail: RECURRING SERIES
Discharge: STILL ADMITTED OR ADMINISTRATIVE | End: 2021-03-02
Attending: RADIOLOGY

## 2021-01-04 ENCOUNTER — OFFICE VISIT CONVERTED (OUTPATIENT)
Dept: ONCOLOGY | Facility: HOSPITAL | Age: 66
End: 2021-01-04
Attending: INTERNAL MEDICINE

## 2021-01-15 ENCOUNTER — HOSPITAL ENCOUNTER (OUTPATIENT)
Dept: OTHER | Facility: HOSPITAL | Age: 66
Discharge: HOME | End: 2021-01-15
Attending: INTERNAL MEDICINE

## 2021-01-15 LAB
ALBUMIN SERPL-MCNC: 3.4 G/DL (ref 3.5–5)
ALBUMIN/GLOB SERPL: 1.2 {RATIO} (ref 1.4–2.6)
ALP SERPL-CCNC: 81 U/L (ref 43–160)
ALT SERPL-CCNC: 20 U/L (ref 10–40)
ANION GAP SERPL CALC-SCNC: 12 MMOL/L (ref 8–19)
AST SERPL-CCNC: 24 U/L (ref 15–50)
BASOPHILS # BLD AUTO: 0.05 10*3/UL (ref 0–0.2)
BASOPHILS NFR BLD AUTO: 1 % (ref 0–3)
BILIRUB SERPL-MCNC: 0.42 MG/DL (ref 0.2–1.3)
BUN SERPL-MCNC: 14 MG/DL (ref 5–25)
BUN/CREAT SERPL: 22 {RATIO} (ref 6–20)
CALCIUM SERPL-MCNC: 9.6 MG/DL (ref 8.7–10.4)
CHLORIDE SERPL-SCNC: 102 MMOL/L (ref 99–111)
CONV ABS IMM GRAN: 0.03 10*3/UL (ref 0–0.2)
CONV CO2: 30 MMOL/L (ref 22–32)
CONV IMMATURE GRAN: 0.6 % (ref 0–1.8)
CONV TOTAL PROTEIN: 6.2 G/DL (ref 6.3–8.2)
CREAT UR-MCNC: 0.64 MG/DL (ref 0.5–0.9)
DEPRECATED RDW RBC AUTO: 48.8 FL (ref 36.4–46.3)
EOSINOPHIL # BLD AUTO: 0.07 10*3/UL (ref 0–0.7)
EOSINOPHIL # BLD AUTO: 1.4 % (ref 0–7)
ERYTHROCYTE [DISTWIDTH] IN BLOOD BY AUTOMATED COUNT: 13.9 % (ref 11.7–14.4)
GFR SERPLBLD BASED ON 1.73 SQ M-ARVRAT: >60 ML/MIN/{1.73_M2}
GLOBULIN UR ELPH-MCNC: 2.8 G/DL (ref 2–3.5)
GLUCOSE SERPL-MCNC: 90 MG/DL (ref 65–99)
HCT VFR BLD AUTO: 33.6 % (ref 37–47)
HGB BLD-MCNC: 10.9 G/DL (ref 12–16)
LYMPHOCYTES # BLD AUTO: 0.41 10*3/UL (ref 1–5)
LYMPHOCYTES NFR BLD AUTO: 8.4 % (ref 20–45)
MCH RBC QN AUTO: 31.9 PG (ref 27–31)
MCHC RBC AUTO-ENTMCNC: 32.4 G/DL (ref 33–37)
MCV RBC AUTO: 98.2 FL (ref 81–99)
MONOCYTES # BLD AUTO: 0.61 10*3/UL (ref 0.2–1.2)
MONOCYTES NFR BLD AUTO: 12.6 % (ref 3–10)
NEUTROPHILS # BLD AUTO: 3.69 10*3/UL (ref 2–8)
NEUTROPHILS NFR BLD AUTO: 76 % (ref 30–85)
NRBC CBCN: 0 % (ref 0–0.7)
OSMOLALITY SERPL CALC.SUM OF ELEC: 290 MOSM/KG (ref 273–304)
PLATELET # BLD AUTO: 264 10*3/UL (ref 130–400)
PMV BLD AUTO: 9 FL (ref 9.4–12.3)
POTASSIUM SERPL-SCNC: 4.2 MMOL/L (ref 3.5–5.3)
RBC # BLD AUTO: 3.42 10*6/UL (ref 4.2–5.4)
SODIUM SERPL-SCNC: 140 MMOL/L (ref 135–147)
WBC # BLD AUTO: 4.86 10*3/UL (ref 4.8–10.8)

## 2021-01-18 ENCOUNTER — OFFICE VISIT CONVERTED (OUTPATIENT)
Dept: ONCOLOGY | Facility: HOSPITAL | Age: 66
End: 2021-01-18
Attending: INTERNAL MEDICINE

## 2021-01-18 LAB
ALBUMIN SERPL-MCNC: 3.5 G/DL (ref 3.5–5)
ALBUMIN/GLOB SERPL: 1.7 {RATIO} (ref 1.4–2.6)
ALP SERPL-CCNC: 79 U/L (ref 43–160)
ALT SERPL-CCNC: 24 U/L (ref 10–40)
ANION GAP SERPL CALC-SCNC: 10 MMOL/L (ref 8–19)
AST SERPL-CCNC: 26 U/L (ref 15–50)
BASOPHILS # BLD AUTO: 0.03 10*3/UL (ref 0–0.2)
BASOPHILS NFR BLD AUTO: 0.6 % (ref 0–3)
BILIRUB SERPL-MCNC: 0.44 MG/DL (ref 0.2–1.3)
BUN SERPL-MCNC: 10 MG/DL (ref 5–25)
BUN/CREAT SERPL: 17 {RATIO} (ref 6–20)
CALCIUM SERPL-MCNC: 9.2 MG/DL (ref 8.7–10.4)
CHLORIDE SERPL-SCNC: 103 MMOL/L (ref 99–111)
CONV ABS IMM GRAN: 0.01 10*3/UL (ref 0–0.54)
CONV CO2: 28 MMOL/L (ref 22–32)
CONV EOSINOPHILS PERCENT BY MANUAL COUNT: 0.9 % (ref 0–7)
CONV IMMATURE GRAN: 0.2 % (ref 0–0.4)
CONV TOTAL PROTEIN: 5.6 G/DL (ref 6.3–8.2)
CREAT UR-MCNC: 0.6 MG/DL (ref 0.5–0.9)
EOSINOPHIL # BLD MANUAL: 0.05 10*3/UL (ref 0–0.7)
ERYTHROCYTE [DISTWIDTH] IN BLOOD BY AUTOMATED COUNT: 13.8 % (ref 11.5–14.5)
ERYTHROCYTE [DISTWIDTH] IN BLOOD BY AUTOMATED COUNT: 49.7 FL
GFR SERPLBLD BASED ON 1.73 SQ M-ARVRAT: >60 ML/MIN/{1.73_M2}
GLOBULIN UR ELPH-MCNC: 2.1 G/DL (ref 2–3.5)
GLUCOSE SERPL-MCNC: 108 MG/DL (ref 65–99)
HBA1C MFR BLD: 10.7 G/DL (ref 12–16)
HCT VFR BLD AUTO: 33 % (ref 37–47)
LYMPHOCYTES # BLD AUTO: 0.34 10*3/UL (ref 1–5)
LYMPHOCYTES NFR BLD AUTO: 6.4 % (ref 20–45)
MCH RBC QN AUTO: 31.8 PG (ref 27–31)
MCHC RBC AUTO-ENTMCNC: 32.4 G/DL (ref 33–37)
MCV RBC AUTO: 98.2 FL (ref 81–99)
MONOCYTES # BLD AUTO: 0.62 10*3/UL (ref 0.2–1.2)
MONOCYTES NFR BLD MANUAL: 11.6 % (ref 3–10)
NEUTROPHILS # BLD AUTO: 4.29 10*3/UL (ref 2–8)
NEUTROPHILS NFR BLD MANUAL: 80.3 % (ref 30–85)
OSMOLALITY SERPL CALC.SUM OF ELEC: 284 MOSM/KG (ref 273–304)
PLATELET # BLD AUTO: 223 10*3/UL (ref 130–400)
PMV BLD AUTO: 8.9 FL (ref 7.4–10.4)
POTASSIUM SERPL-SCNC: 4.1 MMOL/L (ref 3.5–5.3)
RBC MORPH BLD: 3.36 10*6/UL (ref 4.2–5.4)
SODIUM SERPL-SCNC: 137 MMOL/L (ref 135–147)
WBC # BLD AUTO: 5.34 10*3/UL (ref 4.8–10.8)

## 2021-01-19 ENCOUNTER — HOSPITAL ENCOUNTER (OUTPATIENT)
Dept: GENERAL RADIOLOGY | Facility: HOSPITAL | Age: 66
Discharge: HOME | End: 2021-01-19

## 2021-02-03 ENCOUNTER — HOSPITAL ENCOUNTER (OUTPATIENT)
Dept: CARDIOLOGY | Facility: HOSPITAL | Age: 66
Discharge: HOME | End: 2021-02-03
Attending: RADIOLOGY

## 2021-02-08 ENCOUNTER — OFFICE VISIT CONVERTED (OUTPATIENT)
Dept: ONCOLOGY | Facility: HOSPITAL | Age: 66
End: 2021-02-08
Attending: INTERNAL MEDICINE

## 2021-03-05 ENCOUNTER — HOSPITAL ENCOUNTER (OUTPATIENT)
Dept: RADIATION ONCOLOGY | Facility: HOSPITAL | Age: 66
Discharge: HOME | End: 2021-03-05
Attending: RADIOLOGY

## 2021-03-09 ENCOUNTER — OFFICE VISIT CONVERTED (OUTPATIENT)
Dept: ONCOLOGY | Facility: HOSPITAL | Age: 66
End: 2021-03-09
Attending: INTERNAL MEDICINE

## 2021-03-09 LAB
ALBUMIN SERPL-MCNC: 3.1 G/DL (ref 3.5–5)
ALBUMIN/GLOB SERPL: 1.3 {RATIO} (ref 1.4–2.6)
ALP SERPL-CCNC: 61 U/L (ref 43–160)
ALT SERPL-CCNC: 27 U/L (ref 10–40)
ANION GAP SERPL CALC-SCNC: 13 MMOL/L (ref 8–19)
AST SERPL-CCNC: 24 U/L (ref 15–50)
BASOPHILS # BLD AUTO: 0.05 10*3/UL (ref 0–0.2)
BASOPHILS NFR BLD AUTO: 0.7 % (ref 0–3)
BILIRUB SERPL-MCNC: 0.41 MG/DL (ref 0.2–1.3)
BUN SERPL-MCNC: 16 MG/DL (ref 5–25)
BUN/CREAT SERPL: 26 {RATIO} (ref 6–20)
CALCIUM SERPL-MCNC: 9.1 MG/DL (ref 8.7–10.4)
CHLORIDE SERPL-SCNC: 100 MMOL/L (ref 99–111)
CONV ABS IMM GRAN: 0.02 10*3/UL (ref 0–0.54)
CONV CO2: 25 MMOL/L (ref 22–32)
CONV EOSINOPHILS PERCENT BY MANUAL COUNT: 7.1 % (ref 0–7)
CONV IMMATURE GRAN: 0.3 % (ref 0–0.4)
CONV TOTAL PROTEIN: 5.5 G/DL (ref 6.3–8.2)
CREAT UR-MCNC: 0.62 MG/DL (ref 0.5–0.9)
EOSINOPHIL # BLD MANUAL: 0.48 10*3/UL (ref 0–0.7)
ERYTHROCYTE [DISTWIDTH] IN BLOOD BY AUTOMATED COUNT: 14.3 % (ref 11.5–14.5)
ERYTHROCYTE [DISTWIDTH] IN BLOOD BY AUTOMATED COUNT: 49.8 FL
GFR SERPLBLD BASED ON 1.73 SQ M-ARVRAT: >60 ML/MIN/{1.73_M2}
GLOBULIN UR ELPH-MCNC: 2.4 G/DL (ref 2–3.5)
GLUCOSE SERPL-MCNC: 103 MG/DL (ref 65–99)
HBA1C MFR BLD: 10 G/DL (ref 12–16)
HCT VFR BLD AUTO: 31.5 % (ref 37–47)
LYMPHOCYTES # BLD AUTO: 0.34 10*3/UL (ref 1–5)
LYMPHOCYTES NFR BLD AUTO: 5 % (ref 20–45)
MAGNESIUM SERPL-MCNC: 2.05 MG/DL (ref 1.6–2.3)
MCH RBC QN AUTO: 29.4 PG (ref 27–31)
MCHC RBC AUTO-ENTMCNC: 31.7 G/DL (ref 33–37)
MCV RBC AUTO: 92.6 FL (ref 81–99)
MONOCYTES # BLD AUTO: 0.69 10*3/UL (ref 0.2–1.2)
MONOCYTES NFR BLD MANUAL: 10.2 % (ref 3–10)
NEUTROPHILS # BLD AUTO: 5.18 10*3/UL (ref 2–8)
NEUTROPHILS NFR BLD MANUAL: 76.7 % (ref 30–85)
OSMOLALITY SERPL CALC.SUM OF ELEC: 279 MOSM/KG (ref 273–304)
PLATELET # BLD AUTO: 269 10*3/UL (ref 130–400)
PMV BLD AUTO: 8.4 FL (ref 7.4–10.4)
POTASSIUM SERPL-SCNC: 4 MMOL/L (ref 3.5–5.3)
RBC MORPH BLD: 3.4 10*6/UL (ref 4.2–5.4)
SODIUM SERPL-SCNC: 134 MMOL/L (ref 135–147)
WBC # BLD AUTO: 6.76 10*3/UL (ref 4.8–10.8)

## 2021-03-15 ENCOUNTER — HOSPITAL ENCOUNTER (OUTPATIENT)
Dept: VACCINE CLINIC | Facility: HOSPITAL | Age: 66
Discharge: HOME | End: 2021-03-15
Attending: INTERNAL MEDICINE

## 2021-03-23 ENCOUNTER — OFFICE VISIT CONVERTED (OUTPATIENT)
Dept: ONCOLOGY | Facility: HOSPITAL | Age: 66
End: 2021-03-23
Attending: INTERNAL MEDICINE

## 2021-03-23 LAB
ALBUMIN SERPL-MCNC: 3.3 G/DL (ref 3.5–5)
ALBUMIN/GLOB SERPL: 1.4 {RATIO} (ref 1.4–2.6)
ALP SERPL-CCNC: 120 U/L (ref 43–160)
ALT SERPL-CCNC: 25 U/L (ref 10–40)
ANION GAP SERPL CALC-SCNC: 13 MMOL/L (ref 8–19)
AST SERPL-CCNC: 23 U/L (ref 15–50)
BASOPHILS # BLD AUTO: 0.04 10*3/UL (ref 0–0.2)
BASOPHILS NFR BLD AUTO: 0.2 % (ref 0–3)
BILIRUB SERPL-MCNC: 0.22 MG/DL (ref 0.2–1.3)
BUN SERPL-MCNC: 11 MG/DL (ref 5–25)
BUN/CREAT SERPL: 18 {RATIO} (ref 6–20)
CALCIUM SERPL-MCNC: 9.1 MG/DL (ref 8.7–10.4)
CHLORIDE SERPL-SCNC: 99 MMOL/L (ref 99–111)
CONV ABS IMM GRAN: 0.42 10*3/UL (ref 0–0.54)
CONV CO2: 28 MMOL/L (ref 22–32)
CONV EOSINOPHILS PERCENT BY MANUAL COUNT: 3.6 % (ref 0–7)
CONV IMMATURE GRAN: 2 % (ref 0–0.4)
CONV TOTAL PROTEIN: 5.6 G/DL (ref 6.3–8.2)
CREAT UR-MCNC: 0.62 MG/DL (ref 0.5–0.9)
EOSINOPHIL # BLD MANUAL: 0.74 10*3/UL (ref 0–0.7)
ERYTHROCYTE [DISTWIDTH] IN BLOOD BY AUTOMATED COUNT: 15.4 % (ref 11.5–14.5)
ERYTHROCYTE [DISTWIDTH] IN BLOOD BY AUTOMATED COUNT: 51.3 FL
GFR SERPLBLD BASED ON 1.73 SQ M-ARVRAT: >60 ML/MIN/{1.73_M2}
GLOBULIN UR ELPH-MCNC: 2.3 G/DL (ref 2–3.5)
GLUCOSE SERPL-MCNC: 110 MG/DL (ref 65–99)
HBA1C MFR BLD: 9.9 G/DL (ref 12–16)
HCT VFR BLD AUTO: 30.9 % (ref 37–47)
LYMPHOCYTES # BLD AUTO: 0.63 10*3/UL (ref 1–5)
LYMPHOCYTES NFR BLD AUTO: 3.1 % (ref 20–45)
MAGNESIUM SERPL-MCNC: 2.22 MG/DL (ref 1.6–2.3)
MCH RBC QN AUTO: 29.5 PG (ref 27–31)
MCHC RBC AUTO-ENTMCNC: 32 G/DL (ref 33–37)
MCV RBC AUTO: 92 FL (ref 81–99)
MONOCYTES # BLD AUTO: 0.9 10*3/UL (ref 0.2–1.2)
MONOCYTES NFR BLD MANUAL: 4.4 % (ref 3–10)
NEUTROPHILS # BLD AUTO: 17.89 10*3/UL (ref 2–8)
NEUTROPHILS NFR BLD MANUAL: 86.7 % (ref 30–85)
OSMOLALITY SERPL CALC.SUM OF ELEC: 282 MOSM/KG (ref 273–304)
PLATELET # BLD AUTO: 238 10*3/UL (ref 130–400)
PMV BLD AUTO: 9 FL (ref 7.4–10.4)
POTASSIUM SERPL-SCNC: 4 MMOL/L (ref 3.5–5.3)
RBC MORPH BLD: 3.36 10*6/UL (ref 4.2–5.4)
SODIUM SERPL-SCNC: 136 MMOL/L (ref 135–147)
WBC # BLD AUTO: 20.62 10*3/UL (ref 4.8–10.8)

## 2021-04-02 ENCOUNTER — HOSPITAL ENCOUNTER (OUTPATIENT)
Dept: RADIATION ONCOLOGY | Facility: HOSPITAL | Age: 66
Discharge: HOME | End: 2021-04-02
Attending: RADIOLOGY

## 2021-04-05 ENCOUNTER — HOSPITAL ENCOUNTER (OUTPATIENT)
Dept: VACCINE CLINIC | Facility: HOSPITAL | Age: 66
Discharge: HOME | End: 2021-04-05
Attending: INTERNAL MEDICINE

## 2021-04-06 ENCOUNTER — OFFICE VISIT CONVERTED (OUTPATIENT)
Dept: ONCOLOGY | Facility: HOSPITAL | Age: 66
End: 2021-04-06
Attending: INTERNAL MEDICINE

## 2021-04-06 LAB
ALBUMIN SERPL-MCNC: 3 G/DL (ref 3.5–5)
ALBUMIN/GLOB SERPL: 1 {RATIO} (ref 1.4–2.6)
ALP SERPL-CCNC: 100 U/L (ref 43–160)
ALT SERPL-CCNC: 24 U/L (ref 10–40)
ANION GAP SERPL CALC-SCNC: 10 MMOL/L (ref 8–19)
AST SERPL-CCNC: 22 U/L (ref 15–50)
BASOPHILS # BLD AUTO: 0.07 10*3/UL (ref 0–0.2)
BASOPHILS NFR BLD AUTO: 0.3 % (ref 0–3)
BILIRUB SERPL-MCNC: 0.23 MG/DL (ref 0.2–1.3)
BUN SERPL-MCNC: 13 MG/DL (ref 5–25)
BUN/CREAT SERPL: 20 {RATIO} (ref 6–20)
CALCIUM SERPL-MCNC: 9.1 MG/DL (ref 8.7–10.4)
CHLORIDE SERPL-SCNC: 100 MMOL/L (ref 99–111)
CONV ABS IMM GRAN: 0.26 10*3/UL (ref 0–0.54)
CONV CO2: 29 MMOL/L (ref 22–32)
CONV EOSINOPHILS PERCENT BY MANUAL COUNT: 1.7 % (ref 0–7)
CONV IMMATURE GRAN: 1.2 % (ref 0–0.4)
CONV TOTAL PROTEIN: 5.9 G/DL (ref 6.3–8.2)
CREAT UR-MCNC: 0.65 MG/DL (ref 0.5–0.9)
EOSINOPHIL # BLD MANUAL: 0.36 10*3/UL (ref 0–0.7)
ERYTHROCYTE [DISTWIDTH] IN BLOOD BY AUTOMATED COUNT: 16.5 % (ref 11.5–14.5)
ERYTHROCYTE [DISTWIDTH] IN BLOOD BY AUTOMATED COUNT: 53.2 FL
GFR SERPLBLD BASED ON 1.73 SQ M-ARVRAT: >60 ML/MIN/{1.73_M2}
GLOBULIN UR ELPH-MCNC: 2.9 G/DL (ref 2–3.5)
GLUCOSE SERPL-MCNC: 104 MG/DL (ref 65–99)
HBA1C MFR BLD: 9.4 G/DL (ref 12–16)
HCT VFR BLD AUTO: 29.6 % (ref 37–47)
LYMPHOCYTES # BLD AUTO: 0.42 10*3/UL (ref 1–5)
LYMPHOCYTES NFR BLD AUTO: 2 % (ref 20–45)
MAGNESIUM SERPL-MCNC: 2.22 MG/DL (ref 1.6–2.3)
MCH RBC QN AUTO: 28.7 PG (ref 27–31)
MCHC RBC AUTO-ENTMCNC: 31.8 G/DL (ref 33–37)
MCV RBC AUTO: 90.2 FL (ref 81–99)
MONOCYTES # BLD AUTO: 0.96 10*3/UL (ref 0.2–1.2)
MONOCYTES NFR BLD MANUAL: 4.6 % (ref 3–10)
NEUTROPHILS # BLD AUTO: 18.75 10*3/UL (ref 2–8)
NEUTROPHILS NFR BLD MANUAL: 90.2 % (ref 30–85)
OSMOLALITY SERPL CALC.SUM OF ELEC: 280 MOSM/KG (ref 273–304)
PLATELET # BLD AUTO: 336 10*3/UL (ref 130–400)
PMV BLD AUTO: 9 FL (ref 7.4–10.4)
POTASSIUM SERPL-SCNC: 4 MMOL/L (ref 3.5–5.3)
RBC MORPH BLD: 3.28 10*6/UL (ref 4.2–5.4)
SODIUM SERPL-SCNC: 135 MMOL/L (ref 135–147)
WBC # BLD AUTO: 20.82 10*3/UL (ref 4.8–10.8)

## 2021-04-14 ENCOUNTER — HOSPITAL ENCOUNTER (OUTPATIENT)
Dept: MRI IMAGING | Facility: HOSPITAL | Age: 66
Discharge: HOME | End: 2021-04-14
Attending: RADIOLOGY

## 2021-04-15 ENCOUNTER — HOSPITAL ENCOUNTER (OUTPATIENT)
Dept: RADIATION ONCOLOGY | Facility: HOSPITAL | Age: 66
Discharge: HOME | End: 2021-04-15
Attending: NURSE PRACTITIONER

## 2021-04-19 ENCOUNTER — HOSPITAL ENCOUNTER (OUTPATIENT)
Dept: CT IMAGING | Facility: HOSPITAL | Age: 66
Discharge: HOME | End: 2021-04-19
Attending: INTERNAL MEDICINE

## 2021-04-20 ENCOUNTER — CONVERSION ENCOUNTER (OUTPATIENT)
Dept: ONCOLOGY | Facility: HOSPITAL | Age: 66
End: 2021-04-20

## 2021-04-20 LAB
ALBUMIN SERPL-MCNC: 3.2 G/DL (ref 3.5–5)
ALBUMIN/GLOB SERPL: 1.3 {RATIO} (ref 1.4–2.6)
ALP SERPL-CCNC: 102 U/L (ref 43–160)
ALT SERPL-CCNC: 26 U/L (ref 10–40)
ANION GAP SERPL CALC-SCNC: 11 MMOL/L (ref 8–19)
AST SERPL-CCNC: 23 U/L (ref 15–50)
BASOPHILS # BLD AUTO: 0.07 10*3/UL (ref 0–0.2)
BASOPHILS NFR BLD AUTO: 0.4 % (ref 0–3)
BILIRUB SERPL-MCNC: 0.17 MG/DL (ref 0.2–1.3)
BUN SERPL-MCNC: 16 MG/DL (ref 5–25)
BUN/CREAT SERPL: 26 {RATIO} (ref 6–20)
CALCIUM SERPL-MCNC: 9.3 MG/DL (ref 8.7–10.4)
CHLORIDE SERPL-SCNC: 97 MMOL/L (ref 99–111)
CONV ABS IMM GRAN: 0.23 10*3/UL (ref 0–0.54)
CONV CO2: 29 MMOL/L (ref 22–32)
CONV EOSINOPHILS PERCENT BY MANUAL COUNT: 2.1 % (ref 0–7)
CONV IMMATURE GRAN: 1.3 % (ref 0–0.4)
CONV TOTAL PROTEIN: 5.7 G/DL (ref 6.3–8.2)
CREAT UR-MCNC: 0.62 MG/DL (ref 0.5–0.9)
EOSINOPHIL # BLD MANUAL: 0.37 10*3/UL (ref 0–0.7)
ERYTHROCYTE [DISTWIDTH] IN BLOOD BY AUTOMATED COUNT: 17.8 % (ref 11.5–14.5)
ERYTHROCYTE [DISTWIDTH] IN BLOOD BY AUTOMATED COUNT: 56.2 FL
GFR SERPLBLD BASED ON 1.73 SQ M-ARVRAT: >60 ML/MIN/{1.73_M2}
GLOBULIN UR ELPH-MCNC: 2.5 G/DL (ref 2–3.5)
GLUCOSE SERPL-MCNC: 115 MG/DL (ref 65–99)
HBA1C MFR BLD: 9.2 G/DL (ref 12–16)
HCT VFR BLD AUTO: 29.5 % (ref 37–47)
LYMPHOCYTES # BLD AUTO: 0.32 10*3/UL (ref 1–5)
LYMPHOCYTES NFR BLD AUTO: 1.8 % (ref 20–45)
MAGNESIUM SERPL-MCNC: 2.2 MG/DL (ref 1.6–2.3)
MCH RBC QN AUTO: 28.1 PG (ref 27–31)
MCHC RBC AUTO-ENTMCNC: 31.2 G/DL (ref 33–37)
MCV RBC AUTO: 90.2 FL (ref 81–99)
MONOCYTES # BLD AUTO: 0.78 10*3/UL (ref 0.2–1.2)
MONOCYTES NFR BLD MANUAL: 4.4 % (ref 3–10)
NEUTROPHILS # BLD AUTO: 15.93 10*3/UL (ref 2–8)
NEUTROPHILS NFR BLD MANUAL: 90 % (ref 30–85)
OSMOLALITY SERPL CALC.SUM OF ELEC: 278 MOSM/KG (ref 273–304)
PLATELET # BLD AUTO: 315 10*3/UL (ref 130–400)
PMV BLD AUTO: 8.8 FL (ref 7.4–10.4)
POTASSIUM SERPL-SCNC: 4.1 MMOL/L (ref 3.5–5.3)
RBC MORPH BLD: 3.27 10*6/UL (ref 4.2–5.4)
SODIUM SERPL-SCNC: 133 MMOL/L (ref 135–147)
WBC # BLD AUTO: 17.7 10*3/UL (ref 4.8–10.8)

## 2021-05-03 ENCOUNTER — HOSPITAL ENCOUNTER (OUTPATIENT)
Dept: RADIATION ONCOLOGY | Facility: HOSPITAL | Age: 66
Discharge: HOME | End: 2021-05-03
Attending: RADIOLOGY

## 2021-05-04 ENCOUNTER — OFFICE VISIT CONVERTED (OUTPATIENT)
Dept: ONCOLOGY | Facility: HOSPITAL | Age: 66
End: 2021-05-04
Attending: INTERNAL MEDICINE

## 2021-05-04 LAB
ALBUMIN SERPL-MCNC: 3.1 G/DL (ref 3.5–5)
ALBUMIN/GLOB SERPL: 1 {RATIO} (ref 1.4–2.6)
ALP SERPL-CCNC: 116 U/L (ref 43–160)
ALT SERPL-CCNC: 30 U/L (ref 10–40)
ANION GAP SERPL CALC-SCNC: 15 MMOL/L (ref 8–19)
AST SERPL-CCNC: 26 U/L (ref 15–50)
BASOPHILS # BLD AUTO: 0.08 10*3/UL (ref 0–0.2)
BASOPHILS NFR BLD AUTO: 0.5 % (ref 0–3)
BILIRUB SERPL-MCNC: 0.22 MG/DL (ref 0.2–1.3)
BUN SERPL-MCNC: 16 MG/DL (ref 5–25)
BUN/CREAT SERPL: 24 {RATIO} (ref 6–20)
CALCIUM SERPL-MCNC: 9.2 MG/DL (ref 8.7–10.4)
CHLORIDE SERPL-SCNC: 99 MMOL/L (ref 99–111)
CONV ABS IMM GRAN: 0.2 10*3/UL (ref 0–0.54)
CONV CO2: 28 MMOL/L (ref 22–32)
CONV EOSINOPHILS PERCENT BY MANUAL COUNT: 2.9 % (ref 0–7)
CONV IMMATURE GRAN: 1.3 % (ref 0–0.4)
CONV TOTAL PROTEIN: 6.2 G/DL (ref 6.3–8.2)
CREAT UR-MCNC: 0.68 MG/DL (ref 0.5–0.9)
EOSINOPHIL # BLD MANUAL: 0.46 10*3/UL (ref 0–0.7)
ERYTHROCYTE [DISTWIDTH] IN BLOOD BY AUTOMATED COUNT: 20.5 % (ref 11.5–14.5)
ERYTHROCYTE [DISTWIDTH] IN BLOOD BY AUTOMATED COUNT: 64.2 FL
GFR SERPLBLD BASED ON 1.73 SQ M-ARVRAT: >60 ML/MIN/{1.73_M2}
GLOBULIN UR ELPH-MCNC: 3.1 G/DL (ref 2–3.5)
GLUCOSE SERPL-MCNC: 96 MG/DL (ref 65–99)
HBA1C MFR BLD: 9.3 G/DL (ref 12–16)
HCT VFR BLD AUTO: 30.3 % (ref 37–47)
LYMPHOCYTES # BLD AUTO: 0.39 10*3/UL (ref 1–5)
LYMPHOCYTES NFR BLD AUTO: 2.5 % (ref 20–45)
MAGNESIUM SERPL-MCNC: 2.09 MG/DL (ref 1.6–2.3)
MCH RBC QN AUTO: 27.9 PG (ref 27–31)
MCHC RBC AUTO-ENTMCNC: 30.7 G/DL (ref 33–37)
MCV RBC AUTO: 91 FL (ref 81–99)
MONOCYTES # BLD AUTO: 0.84 10*3/UL (ref 0.2–1.2)
MONOCYTES NFR BLD MANUAL: 5.3 % (ref 3–10)
NEUTROPHILS # BLD AUTO: 13.91 10*3/UL (ref 2–8)
NEUTROPHILS NFR BLD MANUAL: 87.5 % (ref 30–85)
OSMOLALITY SERPL CALC.SUM OF ELEC: 285 MOSM/KG (ref 273–304)
PLATELET # BLD AUTO: 357 10*3/UL (ref 130–400)
PMV BLD AUTO: 8.7 FL (ref 7.4–10.4)
POTASSIUM SERPL-SCNC: 4.5 MMOL/L (ref 3.5–5.3)
RBC MORPH BLD: 3.33 10*6/UL (ref 4.2–5.4)
SODIUM SERPL-SCNC: 137 MMOL/L (ref 135–147)
WBC # BLD AUTO: 15.88 10*3/UL (ref 4.8–10.8)

## 2021-05-10 NOTE — H&P
History and Physical      Patient Name: Yao Mcclure   Patient ID: [id number]   Sex: Female   YOB: 1955    Referring Provider: Marybel BARNHART    Visit Date: July 22, 2020    Provider: Silvia Teresa MD   Location: [facility]   Location Address: [address] [place] [address]  [id number]   Location Phone: [phone]          Chief Complaint  · Pre-Surgical History and Physical Examination for [facility]  · Consents for Surgery      History Of Present Illness     Very pleasant lady with a rectal cancer that has grown out of anus and is in perineum... she is going to have chemotherapy and xrt and an ostomy has been requested... the patient knows that this is not to treat the cancer but to help alleviate symptoms and make xrt more tolerable and to help prevent obstruction or fistulas.       Past Medical History  Allergies; Hemorrhoids         Medication List  ibuprofen oral; multivitamin oral tablet; Super B Complex oral         Allergy List  NO KNOWN DRUG ALLERGIES       Allergies Reconciled  Social History  Tobacco (Never)         Review of Systems  · Constitutional  o Denies  o : fever, chills  · Eyes  o Denies  o : yellowish discoloration of the eyes, eye pain  · HENT  o Denies  o : difficulty swallowing, hoarseness  · Cardiovascular  o Denies  o : chest pain on exertion, irregular heart beats  · Respiratory  o Denies  o : shortness of breath, cough  · Gastrointestinal  o * See HPI  · Integument  o Denies  o : rash, Skin Lesion or Lump  · Neurologic  o Denies  o : tingling or numbness, loss of balance  · Musculoskeletal  o Denies  o : joint pain, back pain  · Endocrine  o Denies  o : weight gain, weight loss      Physical Examination  · Constitutional  o Appearance  o : well developed/well nourished patient in no apparent distress  · Respiratory  o Inspection of Chest  o : equal breaths bilaterally  · Gastrointestinal  o Abdominal Examination  o : soft/nontender,  nondistended, no organomegaly appreciated          Assessment  · Preoperative Examination     V72.83  · Rectal cancer     154.1/C20      Plan  · Orders  o General Surgery Order (GENOR) - V72.83, 154.1/C20 - 07/28/2020  o [facility] Pre-Op Covid-19 Screening (48618) - V72.83, 154.1/C20 - 07/23/2020  · Medications  o Medications have been Reconciled  o Transition of Care or Provider Policy  · Instructions  o Handouts Provided-Pre-Procedure Instructions including date and time and location of procedure.  o ****Surgical Orders****  o ****Patient Status****  o RISK AND BENEFITS:  o Consent for surgery: Given these options, the patient has verbally expressed an understanding of the risks of surgery and finds these risks acceptable. We will proceed with surgery as soon as possible.  o Possible risks, complications, benefits, and alternatives to surgical or invasive procedure have been explained to patient and/or legal guardian.  o O.R. PREP: Per protocol  o IV: Per Anesthesia  o Please sign permit for: Colostomy for diversion with powerport placement  o Mefoxin 2 grams IV on call to OR.  o The above History and Physical Examination has been completed within 30 days of admission.            Electronically Signed by: Silvia Teresa MD -Author on July 22, 2020 11:23:43 AM

## 2021-05-10 NOTE — H&P
"   History and Physical      Patient Name: Yao Mcclure   Patient ID: [id number]   Sex: Female   YOB: 1955    Referring Provider: Maryebl BARNHART    Visit Date: August 6, 2020    Provider: Silvia Teresa MD   Location[facility]   Location Address: [address] [place] [address]  [id number]   Location Phone: [phone]          Chief Complaint  · Follow Up Surgery      History Of Present Illness  Yao Mcclure is a 65 year old /White female who is here today for follow up of: diverting colosotmy.   She is doing well-status post surgery. her colostomy is functioning well.       Past Medical History  Allergies; Hemorrhoids         Medication List  ibuprofen oral; metronidazole 500 mg oral tablet; multivitamin oral tablet; neomycin 500 mg oral tablet; Super B Complex oral         Allergy List  NO KNOWN DRUG ALLERGIES       Allergies Reconciled  Social History  Tobacco (Never)         Review of Systems  · Constitutional  o Denies  o : fever, chills  · Eyes  o Denies  o : yellowish discoloration of the eyes, eye pain  · HENT  o Denies  o : difficulty swallowing, hoarseness  · Cardiovascular  o Denies  o : chest pain on exertion, irregular heart beats  · Respiratory  o Denies  o : shortness of breath, cough  · Gastrointestinal  o Denies  o : nausea, vomiting, diarrhea, constipation  · Integument  o Admits  o : see HPI for surgical incision healing  · Neurologic  o Denies  o : tingling or numbness, loss of balance  · Musculoskeletal  o Denies  o : joint pain, back pain  · Endocrine  o Denies  o : weight gain, weight loss  · All Others Negative      Vitals  Date Time BP Position Site L\R Cuff Size HR RR TEMP (F) WT  HT  BMI kg/m2 BSA m2 O2 Sat        08/06/2020 10:52 AM       17  111lbs 2oz 5'  7\" 17.4 1.54           Physical Examination  · Constitutional  o Appearance  o : well developed/well nourished patient in no apparent distress  · Respiratory  o Inspection of Chest  o : " equal breaths bilaterally  · Gastrointestinal  o Abdominal Examination  o : soft/nontender, nondistended, no organomegaly appreciated  · Post Surgical Incision  o Surgical wound  o : healing well, no erythema, ostomy ok          Assessment  · Postoperative Exam Following Surgery     V67.00/Z09      Plan  · Medications  o Medications have been Reconciled  o Transition of Care or Provider Policy  · Instructions  o Return to office with any issues.  o F/U in 1 weeks.            Electronically Signed by: Silvia Teresa MD -Author on August 6, 2020 11:21:58 AM

## 2021-05-10 NOTE — H&P
History and Physical      Patient Name: Yao Mcclure   Patient ID: [id number]   Sex: Female   YOB: 1955    Referring Provider: Marybel BARNHART    Visit Date: November 30, 2020    Provider: Silvia Tersea MD   Location: [facility]   Location Address: [address] [place] [address]  [id number]   Location Phone: [phone]          Chief Complaint  · Follow Up Surgery      History Of Present Illness  Yao Mcclure is a 65 year old /White female who is here today for follow up of: port placement.   She is doing well-status post surgery but having pain at port site and I believe it is from the lack of subcutaneous fat... she has no signs of infection.       Past Medical History  Allergies; Hemorrhoids         Medication List  hydrocodone-acetaminophen 5-325 mg oral tablet; ibuprofen oral; metronidazole 500 mg oral tablet; multivitamin oral tablet; neomycin 500 mg oral tablet; Norco 5-325 mg oral tablet; Super B Complex oral; zinc oxide 20 % topical ointment         Allergy List  NO KNOWN DRUG ALLERGIES       Allergies Reconciled  Social History  Tobacco (Never)         Review of Systems  · Constitutional  o Denies  o : fever, chills  · Eyes  o Denies  o : yellowish discoloration of the eyes, eye pain  · HENT  o Denies  o : difficulty swallowing, hoarseness  · Cardiovascular  o Denies  o : chest pain on exertion, irregular heart beats  · Respiratory  o Denies  o : shortness of breath, cough  · Gastrointestinal  o Denies  o : nausea, vomiting, diarrhea, constipation  · Integument  o Admits  o : see HPI for surgical incision healing  · Neurologic  o Denies  o : tingling or numbness, loss of balance  · Musculoskeletal  o Denies  o : joint pain, back pain  · Endocrine  o Denies  o : weight gain, weight loss  · All Others Negative      Vitals  Date Time BP Position Site L\R Cuff Size HR RR TEMP (F) WT  HT  BMI kg/m2 BSA m2 O2 Sat FR L/min FiO2        11/30/2020  "10:38 AM       15  111lbs 0oz 5'  7\" 17.38 1.54             Physical Examination  · Constitutional  o Appearance  o : well developed/well nourished patient in no apparent distress  · Respiratory  o Inspection of Chest  o : equal breaths bilaterally  · Gastrointestinal  o Abdominal Examination  o : soft/nontender, nondistended, no organomegaly appreciated  · Post Surgical Incision  o Surgical wound  o : healing well, no erythema          Assessment  · Postoperative Exam Following Surgery     V67.00/Z09      Plan  · Medications  o Medications have been Reconciled  o Transition of Care or Provider Policy  · Instructions  o Return to office with any issues.  o F/U in 4 weeks.            Electronically Signed by: Silvia Teresa MD -Author on November 30, 2020 10:53:51 AM  "

## 2021-05-10 NOTE — H&P
"   History and Physical      Patient Name: Yao Mcclure   Patient ID: [id number]   Sex: Female   YOB: 1955    Referring Provider: Marybel BARNHART    Visit Date: August 26, 2020    Provider: Silvia Teresa MD   Location[facility]   Location Address: [address] [place] [address]  [id number]   Location Phone: [phone]          Chief Complaint  · Follow Up Surgery      History Of Present Illness  Yao Mcclure is a 65 year old /White female who is here today for follow up of: ex lap with lysis of adhesions for sbo.   She is doing well-status post surgery. Her ostomy is functioning well but she is having increased pain due to the local invasion of rectal mass.       Past Medical History  Allergies; Hemorrhoids         Medication List  hydrocodone-acetaminophen 5-325 mg oral tablet; ibuprofen oral; metronidazole 500 mg oral tablet; multivitamin oral tablet; neomycin 500 mg oral tablet; Norco 5-325 mg oral tablet; Super B Complex oral; zinc oxide 20 % topical ointment         Allergy List  NO KNOWN DRUG ALLERGIES       Allergies Reconciled  Social History  Tobacco (Never)         Review of Systems  · Constitutional  o Denies  o : fever, chills  · Eyes  o Denies  o : yellowish discoloration of the eyes, eye pain  · HENT  o Denies  o : difficulty swallowing, hoarseness  · Cardiovascular  o Denies  o : chest pain on exertion, irregular heart beats  · Respiratory  o Denies  o : shortness of breath, cough  · Gastrointestinal  o * See HPI  · Integument  o Admits  o : see HPI for surgical incision healing  · Neurologic  o Denies  o : tingling or numbness, loss of balance  · Musculoskeletal  o Denies  o : joint pain, back pain  · Endocrine  o Denies  o : weight gain, weight loss  · All Others Negative      Vitals  Date Time BP Position Site L\R Cuff Size HR RR TEMP (F) WT  HT  BMI kg/m2 BSA m2 O2 Sat HC       08/26/2020 11:02 AM       15  111lbs 0oz 5'  7\" 17.38 1.54   "         Physical Examination  · Constitutional  o Appearance  o : well developed/well nourished patient in no apparent distress  · Respiratory  o Inspection of Chest  o : equal breaths bilaterally  · Gastrointestinal  o Abdominal Examination  o : soft/nontender, nondistended, no organomegaly appreciated  · Post Surgical Incision  o Surgical wound  o : healing well, no erythema, ostomy functioning well           Assessment  · Postoperative Exam Following Surgery     V67.00/Z09      Plan  · Medications  o Medications have been Reconciled  o Transition of Care or Provider Policy  · Instructions  o Needs to get in with rad onc and oncology  o Return to office with any issues.  o F/U in 1 weeks.            Electronically Signed by: Silvia Teresa MD -Author on August 26, 2020 11:53:34 AM

## 2021-05-10 NOTE — H&P
History and Physical      Patient Name: Yao Mcclure   Patient ID: [id number]   Sex: Female   YOB: 1955        Visit Date: July 10, 2020    Provider: OBEY Jin   Location: [facility]   Location Address: [address] [place] [address], [address] [place] [address]  [id number]   Location Phone: [phone]          Chief Complaint  · Est Care  · hemorrhoids for 2-3 years, external x 2-3 months. The pain is excruciating, has a mucus drainage. Unable to have regular/normal bowel movements. 30lb weight loss. Loss of appetite      History Of Present Illness  Yao Mcclure is a 65 year old /White female who presents for evaluation and treatment of: here to [facility]. pt had pain for last 2 months and used [employer] phone line for the pain she's had. has been on atb but rectal pain has worsened. had a 30lb wt loss and first noticed in june.she's had no medical care.       Review of Systems  · Constitutional  o Denies  o : fatigue, night sweats  · Eyes  o Denies  o : double vision, blurred vision  · HENT  o Denies  o : vertigo, recent head injury  · Breasts  o Denies  o : abnormal changes in breast size, additional breast symptoms except as noted in the HPI  · Cardiovascular  o Denies  o : chest pain, irregular heart beats  · Respiratory  o Denies  o : shortness of breath, productive cough  · Gastrointestinal  o Admits  o : she has been able to eat most things but having nausea, denies abd pain, bowels have not been normal, describes as mucous. not able to really have a bowel movement, sm amts  o Denies  o : vomiting,diarrhea  · Genitourinary  o Admits  o : no probs with urination  o Denies  o : dysuria, urinary retention  · Integument  o Denies  o : hair growth change, new skin lesions  · Neurologic  o Denies  o : altered mental status, seizures  · Musculoskeletal  o Denies  o : joint swelling, limitation of motion  · Endocrine  o Denies  o : cold intolerance, heat  "intolerance  · Psychiatric  o Denies  o : anxiety, depression, hallucinations, difficulty sleeping, suicidal ideation, excessive anger  · Heme-Lymph  o Denies  o : petechiae, lymph node enlargement or tenderness  · Allergic-Immunologic  o Denies  o : frequent illnesses      Vitals  Date Time BP Position Site L\R Cuff Size HR RR TEMP (F) WT  HT  BMI kg/m2 BSA m2 O2 Sat HC       07/10/2020 10:10 /54 Sitting    86 - R 9 97.3 112lbs 9oz 5'  7\" 17.63 1.55 98 %          Physical Examination  · Constitutional  o Appearance  o : well-nourished, well developed, alert, in no acute distress  · Head and Face  o Face  o :   § Inspection  § : no pallor  · Eyes  o Conjunctivae  o : conjunctivae normal  o Sclerae  o : sclerae white  o Pupils and Irises  o : pupils equal, round, and reactive to light and accommodation bilaterally  o Corneas  o : tear film normal, no lesions present  o Eyelids/Ocular Adnexae  o : eyelid appearance normal, no exudates present, eye moisture level normal  · Ears, Nose, Mouth and Throat  o Ears  o : external ear auricle normal, otic canal normal, TM with no reddness, effusion, retraction  o Nose  o : external normal, nasal mucosa normal, turbinates normal  o Oral Cavity  o : tongue no lesion, oral mucosa normal  o Throat  o : no erythemia, exudate or lesions  · Neck  o Inspection/Palpation  o : normal appearance, no masses or tenderness, trachea midline, no enlarged cervical or supraclavicular lymphnodes palpated  o Thyroid  o : gland size normal, nontender, no nodules or masses present on palpation, thyroid motion normal during swallowing  · Respiratory  o Respiratory Effort  o : breathing unlabored  o Inspection of Chest  o : normal appearance, no retractions  o Auscultation of Lungs  o : normal breath sounds throughout  · Cardiovascular  o Heart  o :   § Auscultation of Heart  § : regular rate and rhythm without murmur  o Peripheral Vascular System  o :   § Extremities  § : no edema, no " cyanosis, no distal hair loss, normal capillary refill  · Gastrointestinal  o Abdominal Examination  o : abdomen nontender to palpation, hypoactive bowel sounds present, tone normal without rigidity or guarding, no masses present, abdomen scaphoid upon supine  · Musculoskeletal  o General  o :   § General Musculoskeletal  § : No joint swelling or deformity., Muscle tone, strength, and development grossly normal.  · Skin and Subcutaneous Tissue  o General Inspection  o : no rashes or lesions present, no areas of discoloration  · Neurologic  o Mental Status Examination  o : judgement, insight intact, modd and affect appropriate  o Motor Examination  o : strength grossly intact in all four extremities  o Gait and Station  o : normal gait, able to stand without difficulty     : R labia swollen, no lesions. vagina no lesions or inflammation noted. Rectal vault numerous nodules palpated. pt has approx. 4c,x2cm hard, bleeding, red nodule in perineum, no hemorrhoids around anus           Assessment  · Screening for depression     V79.0/Z13.89  · Screening for lipid disorders     V77.91/Z13.220  · Screening for colon cancer     V76.51/Z12.11  · Post menopausal syndrome     V49.81/Z78.0  · Visit for screening mammogram     V76.12/Z12.31  · Depression     311/F32.9  · Fatigue     780.79/R53.83  · Vitamin D deficiency     268.9/E55.9  · Screening for diabetes mellitus     V77.1/Z13.1  · Screening for cardiovascular condition     V81.2/Z13.6  · Screening for thyroid disorder     V77.0/Z13.29  · Hemorrhoids     455.6/K64.9  · Loss of appetite     783.0/R63.0  · Weight loss     783.21/R63.4  · Altered bowel habits     787.99/R19.4  · Pain with bowel movements     564.00/R19.8  · Perineum pain, female     625.9/N94.9      Plan  · Orders  o COLONOSCOPY REFERRAL (COLON) - V76.51/Z12.11 - 07/10/2020  o DEXA Bone Density, 1 or more sites, axial skeleton [facility] (32514) - V49.81/Z78.0 - 07/10/2020  o Screening Mammography; Bilateral 3D  (35369, 01090, ) - V76.12/Z12.31 - 07/10/2020  o Iron panel (iron, TIBC, transferrin saturation) (82128, 42354, 43843) - 780.79/R53.83 - 07/10/2020  o B12 Folate levels (B12FO) - 780.79/R53.83 - 07/10/2020  o Hgb A1c [facility] (87527) - V77.1/Z13.1 - 07/10/2020  o Physical, Primary Care Panel (CBC, CMP, Lipid, TSH) [facility] (52516, 96088, 75578, 82866) - V77.91/Z13.220, V81.2/Z13.6, V77.0/Z13.29, 780.79/R53.83 - 07/10/2020  o Vitamin D (25-Hydroxy) Level (11498) - 268.9/E55.9 - 07/10/2020  o ACO-39: Current medications updated and reviewed () - - 07/10/2020  o ACO-18: Positive screen for clinical depression using a standardized tool and a follow-up plan documented () - - 07/10/2020  o ACO-14: Influenza immunization was not administered for reasons documented () - - 07/10/2020  o ACO-13: Fall Risk Screening with no falls in past year or only one fall without injury in the past year (1101F) - - 07/10/2020  o Folate level (17761) - 780.79/R53.83, 311/F32.9 - 07/10/2020  · Medications  o Medications have been Reconciled  o Transition of Care or Provider Policy  · Instructions  o Depression Screen completed and scanned into the EMR under the designated folder within the patient's documents.  o Today's PHQ-9 result is _16__  o Stop taking calcium supplements for at least 48 hours prior to your exam. Failure to stop supplements could alter results, and the radiologists will require you to reschedule your test.  o Patient was educated/instructed on their diagnosis, treatment and medications prior to discharge from the clinic today.  o call placed to general surgery and info given for Dr. Garcia. pt sent to ER to have area eval. pt was in agreement with this.   o report called to ER  o I did discuss my concern about her wt loss and she's had no labs to determine infection. had no imaging   o f/u pending ER findings  · Disposition  o Follow up in 1 month            Electronically Signed by: OBEY Jin  -Author on July 10, 2020 03:04:28 PM

## 2021-05-14 VITALS — BODY MASS INDEX: 17.42 KG/M2 | RESPIRATION RATE: 15 BRPM | HEIGHT: 67 IN | WEIGHT: 111 LBS

## 2021-05-14 VITALS — RESPIRATION RATE: 15 BRPM | WEIGHT: 111 LBS | HEIGHT: 67 IN | BODY MASS INDEX: 17.42 KG/M2

## 2021-05-15 VITALS
WEIGHT: 112.56 LBS | RESPIRATION RATE: 9 BRPM | HEIGHT: 67 IN | SYSTOLIC BLOOD PRESSURE: 111 MMHG | TEMPERATURE: 97.3 F | DIASTOLIC BLOOD PRESSURE: 54 MMHG | BODY MASS INDEX: 17.67 KG/M2 | OXYGEN SATURATION: 98 % | HEART RATE: 86 BPM

## 2021-05-15 VITALS — WEIGHT: 112.56 LBS | BODY MASS INDEX: 17.67 KG/M2 | RESPIRATION RATE: 16 BRPM | HEIGHT: 67 IN

## 2021-05-15 VITALS — RESPIRATION RATE: 17 BRPM | BODY MASS INDEX: 17.44 KG/M2 | WEIGHT: 111.12 LBS | HEIGHT: 67 IN

## 2021-05-18 ENCOUNTER — HOSPITAL ENCOUNTER (OUTPATIENT)
Dept: OTHER | Facility: HOSPITAL | Age: 66
Setting detail: RECURRING SERIES
Discharge: HOME | End: 2021-05-18
Attending: INTERNAL MEDICINE

## 2021-05-18 ENCOUNTER — OFFICE VISIT CONVERTED (OUTPATIENT)
Dept: ONCOLOGY | Facility: HOSPITAL | Age: 66
End: 2021-05-18
Attending: INTERNAL MEDICINE

## 2021-05-18 LAB
ANION GAP SERPL CALC-SCNC: 3 MMOL/L (ref 8–19)
BASOPHILS # BLD AUTO: 0.03 10*3/UL (ref 0–0.2)
BASOPHILS NFR BLD AUTO: 0.2 % (ref 0–3)
CONV ABS IMM GRAN: 0.15 10*3/UL (ref 0–0.54)
CONV EOSINOPHILS PERCENT BY MANUAL COUNT: 0.5 % (ref 0–7)
CONV IMMATURE GRAN: 1 % (ref 0–0.4)
EOSINOPHIL # BLD MANUAL: 0.07 10*3/UL (ref 0–0.7)
ERYTHROCYTE [DISTWIDTH] IN BLOOD BY AUTOMATED COUNT: 21.9 % (ref 11.5–14.5)
ERYTHROCYTE [DISTWIDTH] IN BLOOD BY AUTOMATED COUNT: 70.1 FL
HBA1C MFR BLD: 8.7 G/DL (ref 12–16)
HCT VFR BLD AUTO: 27.8 % (ref 37–47)
LYMPHOCYTES # BLD AUTO: 0.27 10*3/UL (ref 1–5)
LYMPHOCYTES NFR BLD AUTO: 1.8 % (ref 20–45)
MAGNESIUM SERPL-MCNC: 2.17 MG/DL (ref 1.6–2.3)
MCH RBC QN AUTO: 28.3 PG (ref 27–31)
MCHC RBC AUTO-ENTMCNC: 31.3 G/DL (ref 33–37)
MCV RBC AUTO: 90.6 FL (ref 81–99)
MONOCYTES # BLD AUTO: 0.68 10*3/UL (ref 0.2–1.2)
MONOCYTES NFR BLD MANUAL: 4.5 % (ref 3–10)
NEUTROPHILS # BLD AUTO: 13.81 10*3/UL (ref 2–8)
NEUTROPHILS NFR BLD MANUAL: 92 % (ref 30–85)
OSMOLALITY SERPL CALC.SUM OF ELEC: 288 MOSM/KG (ref 273–304)
PLATELET # BLD AUTO: 278 10*3/UL (ref 130–400)
PMV BLD AUTO: 8.6 FL (ref 7.4–10.4)
RBC MORPH BLD: 3.07 10*6/UL (ref 4.2–5.4)
SODIUM SERPL-SCNC: 137 MMOL/L (ref 135–147)
WBC # BLD AUTO: 15.01 10*3/UL (ref 4.8–10.8)

## 2021-05-28 VITALS
DIASTOLIC BLOOD PRESSURE: 56 MMHG | BODY MASS INDEX: 18.46 KG/M2 | BODY MASS INDEX: 17.96 KG/M2 | DIASTOLIC BLOOD PRESSURE: 79 MMHG | HEART RATE: 86 BPM | RESPIRATION RATE: 20 BRPM | RESPIRATION RATE: 16 BRPM | WEIGHT: 114.64 LBS | TEMPERATURE: 96.8 F | RESPIRATION RATE: 16 BRPM | DIASTOLIC BLOOD PRESSURE: 54 MMHG | SYSTOLIC BLOOD PRESSURE: 108 MMHG | HEART RATE: 99 BPM | OXYGEN SATURATION: 99 % | OXYGEN SATURATION: 100 % | HEIGHT: 66 IN | WEIGHT: 117.28 LBS | TEMPERATURE: 99.8 F | SYSTOLIC BLOOD PRESSURE: 107 MMHG | OXYGEN SATURATION: 100 % | BODY MASS INDEX: 18.37 KG/M2 | TEMPERATURE: 99.5 F | SYSTOLIC BLOOD PRESSURE: 143 MMHG | HEART RATE: 96 BPM | WEIGHT: 114.86 LBS

## 2021-05-28 VITALS
BODY MASS INDEX: 17.71 KG/M2 | BODY MASS INDEX: 25.53 KG/M2 | RESPIRATION RATE: 20 BRPM | OXYGEN SATURATION: 100 % | DIASTOLIC BLOOD PRESSURE: 54 MMHG | BODY MASS INDEX: 16.82 KG/M2 | BODY MASS INDEX: 23.45 KG/M2 | SYSTOLIC BLOOD PRESSURE: 94 MMHG | TEMPERATURE: 97.2 F | OXYGEN SATURATION: 99 % | OXYGEN SATURATION: 100 % | BODY MASS INDEX: 23.13 KG/M2 | TEMPERATURE: 98.9 F | TEMPERATURE: 99 F | SYSTOLIC BLOOD PRESSURE: 103 MMHG | BODY MASS INDEX: 17.13 KG/M2 | HEART RATE: 68 BPM | HEART RATE: 91 BPM | SYSTOLIC BLOOD PRESSURE: 113 MMHG | HEART RATE: 95 BPM | RESPIRATION RATE: 18 BRPM | WEIGHT: 108.25 LBS | WEIGHT: 107.36 LBS | BODY MASS INDEX: 17.37 KG/M2 | HEART RATE: 108 BPM | WEIGHT: 110.89 LBS | RESPIRATION RATE: 18 BRPM | OXYGEN SATURATION: 97 % | SYSTOLIC BLOOD PRESSURE: 117 MMHG | TEMPERATURE: 99.1 F | DIASTOLIC BLOOD PRESSURE: 58 MMHG | OXYGEN SATURATION: 98 % | RESPIRATION RATE: 18 BRPM | DIASTOLIC BLOOD PRESSURE: 47 MMHG | TEMPERATURE: 98.2 F | BODY MASS INDEX: 16.95 KG/M2 | HEIGHT: 65 IN | TEMPERATURE: 98.4 F | DIASTOLIC BLOOD PRESSURE: 67 MMHG | HEART RATE: 69 BPM | DIASTOLIC BLOOD PRESSURE: 44 MMHG | OXYGEN SATURATION: 98 % | BODY MASS INDEX: 22.34 KG/M2 | RESPIRATION RATE: 18 BRPM | RESPIRATION RATE: 20 BRPM | HEART RATE: 81 BPM | RESPIRATION RATE: 20 BRPM | TEMPERATURE: 98.6 F | OXYGEN SATURATION: 96 % | OXYGEN SATURATION: 98 % | TEMPERATURE: 99 F | RESPIRATION RATE: 18 BRPM | WEIGHT: 149.69 LBS | RESPIRATION RATE: 16 BRPM | HEART RATE: 92 BPM | BODY MASS INDEX: 21.75 KG/M2 | HEART RATE: 71 BPM | SYSTOLIC BLOOD PRESSURE: 105 MMHG | HEART RATE: 85 BPM | RESPIRATION RATE: 22 BRPM | HEART RATE: 59 BPM | DIASTOLIC BLOOD PRESSURE: 51 MMHG | DIASTOLIC BLOOD PRESSURE: 51 MMHG | WEIGHT: 149.03 LBS | SYSTOLIC BLOOD PRESSURE: 108 MMHG | RESPIRATION RATE: 18 BRPM | OXYGEN SATURATION: 97 % | HEART RATE: 87 BPM | OXYGEN SATURATION: 97 % | DIASTOLIC BLOOD PRESSURE: 73 MMHG | SYSTOLIC BLOOD PRESSURE: 99 MMHG | BODY MASS INDEX: 16.88 KG/M2 | WEIGHT: 147.71 LBS | BODY MASS INDEX: 19.16 KG/M2 | WEIGHT: 153.22 LBS | DIASTOLIC BLOOD PRESSURE: 49 MMHG | WEIGHT: 122.36 LBS | TEMPERATURE: 98.3 F | OXYGEN SATURATION: 100 % | SYSTOLIC BLOOD PRESSURE: 122 MMHG | OXYGEN SATURATION: 98 % | TEMPERATURE: 98 F | DIASTOLIC BLOOD PRESSURE: 70 MMHG | RESPIRATION RATE: 20 BRPM | WEIGHT: 107.81 LBS | WEIGHT: 113.1 LBS | DIASTOLIC BLOOD PRESSURE: 60 MMHG | BODY MASS INDEX: 23.34 KG/M2 | RESPIRATION RATE: 20 BRPM | OXYGEN SATURATION: 97 % | SYSTOLIC BLOOD PRESSURE: 130 MMHG | SYSTOLIC BLOOD PRESSURE: 128 MMHG | WEIGHT: 142.64 LBS | HEART RATE: 100 BPM | SYSTOLIC BLOOD PRESSURE: 104 MMHG | SYSTOLIC BLOOD PRESSURE: 126 MMHG | DIASTOLIC BLOOD PRESSURE: 45 MMHG | TEMPERATURE: 99 F | HEART RATE: 98 BPM | TEMPERATURE: 98 F | SYSTOLIC BLOOD PRESSURE: 121 MMHG | WEIGHT: 109.35 LBS | TEMPERATURE: 98.8 F | DIASTOLIC BLOOD PRESSURE: 50 MMHG | WEIGHT: 138.89 LBS

## 2021-05-28 VITALS
DIASTOLIC BLOOD PRESSURE: 63 MMHG | WEIGHT: 113.54 LBS | BODY MASS INDEX: 18.92 KG/M2 | TEMPERATURE: 98.7 F | HEART RATE: 87 BPM | RESPIRATION RATE: 20 BRPM | SYSTOLIC BLOOD PRESSURE: 113 MMHG | HEIGHT: 65 IN | OXYGEN SATURATION: 100 %

## 2021-05-28 VITALS
TEMPERATURE: 97.8 F | HEART RATE: 98 BPM | RESPIRATION RATE: 20 BRPM | DIASTOLIC BLOOD PRESSURE: 70 MMHG | SYSTOLIC BLOOD PRESSURE: 107 MMHG | OXYGEN SATURATION: 96 % | BODY MASS INDEX: 18.23 KG/M2 | WEIGHT: 116.4 LBS

## 2021-05-28 NOTE — PROGRESS NOTES
Patient: ROBERTO CARLOS MCCLURE     Acct: [id number]     Report: #[id number]  UNIT #: [id number]     : 1955    Encounter Date:10/12/2020  PRIMARY CARE:   ***Signed***  --------------------------------------------------------------------------------------------------------------------  NURSE INTAKE      Visit Type      Established Patient Visit            Chief Complaint      RECTAL CA            History and Present Illness      Past Oncology Illness History      Ms. Mcclure comes in today with her  to discuss her new diagnosis of Rectal    cancer.  She was referred to me by Dr. Silvia Granados.             CT of the abdomen/pelvis on 7/10/2020 showed 1) diffuse wall thickening in the     distal rectum.  Heterogeneous enhancement of the perineum.  2) inguinal     adenopathy measuring up to 2.5 cm.  3) 3 mm noncalcified right lower lobe     nodule.  4) wall thickening in the fundus of the gallbladder with faint     calcification.  Focal adenomatosis is favored over neoplasm.  5) intrahepatic     and extrahepatic biliary duct dilation.            Biopsy of anal mass on 2020.  Pathology was positive for adenocarcinoma,     poorly differentiated.            CT of the chest on 2020 showed a 3 mm nodule in the right lower lobe and a     3 mm nodule in the left upper lobe.            Patient was taken to the operating room for port placement and a diverting     colostomy on 2020.  I was called on 2020 when the patient had MRI of     the abdomen and MRCP and inform that she had evidence of a small bowel     obstruction.  I contacted the patient and sent her to the hospital for evalua    tion and treatment.  She was admitted from 2020 to 2020 and had to     return to the operating room for lysis of adhesions.            20:        MRI abdomen:      1. Fluid distended esophagus, stomach, and small bowel highly suspicious for     high-grade small bowel obstruction.  A transition point  is not clearly     identified in part due to MR technique and field of view.  CT may be helpful for    further evaluation of the exact location of a suspected transition point.      2. Abnormal predominantly extrahepatic biliary ductal dilatation.  No definite     choledocholithiasis is present however it would be difficult to fully exclude     due to the quality of the exam.  Ampullary stenosis or stricture could be p    resent.  If patient has abnormal liver function tests or an elevated bilirubin,     further evaluation with ERCP could be considered for definitive evaluation.      3. Cholelithiasis.              MRI pelvis:       1. Locally advanced low rectal cancer with significant involvement of the anal     sphincteric complex and localized invasion of the vagina.  Findings are     compatible with T4b tumor.      2. Bulbous appearance of the urethra raises question of possible urethral     involvement with tumor.  Correlate clinically for symptoms of urinary     retention/obstruction.      3. Bilateral inguinal adenopathy compatible with ryne involvement.      4. Nonspecific pelvic subcutaneous edema and vulvar edema.             CT abdomen/pelvis on 8/7/2020:      CONCLUSION:         1. High-grade small bowel obstruction with a discrete transition zone within the    region of the mid jejunum in the left lower quadrant of the abdomen.      2. Intrahepatic and extrahepatic biliary ductal dilatation without a discrete     mass or stone identified.      3. Ulcerating mass involving the rectum, perineum, and vagina with metastatic     bilateral inguinal adenopathy            Cycle 1 of FOLFOX on 8/31/2020      Cycle 2 of FOLFOX on 9/14/2020      Cycle 3: 9/28/20      cycle 4: 10/12/2020            HPI - Oncology Interim      Patient comes in today for her fourth cycle of FOLFOX.  She still has aching     pain but generally feels that it is improved or at least better controlled.  She    says she is eating more  but her weight is still down slightly more.  She has     nausea almost daily.  She denies any significant neuropathy.            ECOG Performance Status      2            Most Recent Lab Findings      Laboratory Tests      9/28/20 09:00            10/12/20 09:53            Laboratory Tests            Test       9/28/20      09:00             Magnesium Level       2.31 mg/dL      (1.60-2.30)            PAST, FAMILY   Past Medical History      Past Medical History:  None      Hematology/Oncology (F):  None      Other Hematology History:        Rectal cancer.      Genetic/Metabolic:  None            Past Surgical History      Appendectomy, Biopsy, Hysterectomy            Family History      Family History:  Lung Cancer            Social History      Lives independently:  Yes      Number of Children:  2      Occupation:  RETIRED            Tobacco Use      Tobacco status:  Never smoker            Substance Use      Substance use:  Denies use            REVIEW OF SYSTEMS      General:  Admits: Fatigue, Weight Loss;          Denies: Appetite Change, Fever, Night Sweats, Weight Gain      Eye:  Admits Corrective Lenses; Denies Blurred Vision, Denies Diplopia, Denies     Vision Changes      ENT:  Denies Headache, Denies Hearing Loss, Denies Hoarseness, Denies Sore     Throat      Cardiovascular:  Denies Chest Pain, Denies Palpitations      Respiratory:  Denies: Cough, Coughing Blood, Productive Cough, Shortness of Air,    Wheezing      Gastrointestinal:  Admits Nausea/Vomiting; Denies Bloody Stools, Denies     Constipation, Denies Diarrhea, Denies Problem Swallowing, Denies Unable to     Control Bowels      Genitourinary:  Denies Blood in Urine, Denies Incontinence, Denies Painful     Urination      Musculoskeletal:  Denies Back Pain, Denies Muscle Pain, Denies Painful Joints      Integumentary:  Denies Itching, Denies Lesions, Denies Rash      Neurologic:  Denies Dizziness, Denies Numbness\Tingling, Denies Seizures       Psychiatric:  Denies Anxiety, Denies Depression      Endocrine:  Denies Cold Intolerance, Denies Heat Intolerance      Hematologic/Lymphatic:  Denies Bruising, Denies Bleeding, Denies Enlarged Lymph     Nodes      Reproductive:  Denies: Menopause, Heavy Periods, Pregnant, Still Menstruating            VITAL SIGNS AND SCORES      Vitals      Weight 107 lbs 5.824 oz / 48.7 kg      Temperature 99.0 F / 37.22 C - Temporal      Pulse 81      Respirations 20      Blood Pressure 99/47 Sitting      Pulse Oximetry 98%, RM AIR            Pain Score      Experiencing any pain?:  No      Pain Scale Used:  Numerical      Pain Intensity:  0            Fatigue Score      Experiencing any fatigue?:  Yes      Fatigue (0-10 scale):  8            EXAM      General: Alert, cooperative, no acute distress, very thin/cachectic      Eyes: Anicteric sclera, PERRLA      HEENT: Oropharynx clear, no exudates      Respiratory: CTAB, normal respiratory effort      Abdomen: Normal active bowel sounds, no tenderness, no distention      Cardiovascular: RRR, no murmur, no peripheral edema      Skin: Normal tone, no rash, no lesions      Psychiatric: Appropriate affect, intact judgment      Neurologic: No focal sensory or motor deficits, no weakness, numbness, dizziness      Musculoskeletal: Normal muscle strength, normal muscle tone      Extremities: No clubbing, cyanosis, or deformities            PREVENTION      Hx Influenza Vaccination:  No      Influenza Vaccine Declined:  Yes      2 or More Falls in Past Year?:  No      Fall Past Year with Injury?:  No      Encouraged to follow-up with:  PCP regarding preventative exams.      Chart initiated by      JO-ANN BROWN MA            ALLERGY/MEDS      Allergies      Coded Allergies:             NO KNOWN ALLERGIES (Verified , 10/12/20)            Medications      Last Reconciled on 10/22/20 22:25 by FADIA CENTENO      Morphine Sulfate ER (Morphine Sulfate ER) 15 Mg Tablet.er      15 MG PO Q12HR, #60 TAB          Prov: FADIA CENTENO         9/29/20       Polyethylene Glycol (Miralax*) 17 Gm Packet      17 GM PO QDAY, #30 PKT 0 Refills         Prov: FADIA CENTENO         9/14/20       Loperamide (Loperamide) 2 Mg Capsule      2 MG PO ASDIR, #60 TAB 5 Refills         Prov: FADIA CENTENO         8/25/20       Ondansetron Hcl (ONDANSETRON HCL) 8 Mg Tablet      8 MG PO Q8H PRN for NAUSEA, #30 TAB 3 Refills         Prov: FADIA CENTENO         8/20/20       Prochlorperazine (Compazine) 5 Mg Tab      10 MG PO TID for NAUSEA, #30 TAB 3 Refills         Prov: FADIA CENTENO         8/20/20       Zinc Oxide 20% Ointment (Zinc Oxide 20% Oint*) 28.35 Gm Oint...g.      1 APL TOPICAL TID for 30 Days, #1 TUBE         Prov: TrevorBilly         8/18/20       Docusate Sodium (Docusate Sodium) 100 Mg Capsule      100 MG PO QDAY, CAP         Reported         8/7/20       HYDROcodone-Acetaminophen 5-325 Mg (HYDROcodone-Acetaminophen 5-325 Mg) 1 Each     Tablet      1-2 TAB PO Q4H PRN for PAIN, #36 TAB         Prov: CRHIS DANIELS         7/31/20      Medications Reviewed:  No Changes made to meds            IMPRESSION/PLAN      Plan      Rectal adenocarcinoma: Patient has extensive disease throughout her pelvis with     local invasion into adjacent structures including the vaginal wall and likely     urinary tract.  Patient underwent diverting ostomy which was complicated by     bowel obstruction.  She has since been doing better and is here for her 4th     cycle of chemotherapy with FOLFOX.  We will plan for CT CAP after the next     cycle.            Rectal pain: Secondary to patient's underlying malignancy.  Her pain is     currently well controlled with 15 mg of MS Contin twice a day as well as     hydrocodone 5 mg every 4 hours.            Iron deficiency anemia: Secondary to the patient's history of GI bleeding.  IV     iron supplementation in August with Injectafer.            Malnutrition: Secondary to underlying malignancy.   We discussed appetite     stimulating medication.  Patient first like to talk with nutrition and increase     her boost/Ensure supplements.            Patient Education      Patient Education Provided:  Yes            Electronically signed by FADIA CENTENO  10/22/2020 22:26       Disclaimer: Converted document may not contain table formatting or lab diagrams. Please see [employer] for the authenticated document.

## 2021-05-28 NOTE — PROGRESS NOTES
Patient: ROBERTO CARLOS LANE     Acct: [id number]     Report: #[id number]  UNIT #: [id number]     : 1955    Encounter Date:2020  PRIMARY CARE: *SHOLA LARIOS brayan  ***Signed***  --------------------------------------------------------------------------------------------------------------------  NURSE INTAKE      Visit Type      Established Patient Visit            Chief Complaint      RECTAL CA            Referring Provider/Copies To      Referring Provider:  JOY AMADOR MD            History and Present Illness      Past Oncology Illness History      Ms. Lane comes in today with her  to discuss her new diagnosis of Rectal    cancer.  She was referred to me by Dr. Joy Granados.             CT of the abdomen/pelvis on 7/10/2020 showed 1) diffuse wall thickening in the     distal rectum.  Heterogeneous enhancement of the perineum.  2) inguinal     adenopathy measuring up to 2.5 cm.  3) 3 mm noncalcified right lower lobe nodu    le.  4) wall thickening in the fundus of the gallbladder with faint     calcification.  Focal adenomatosis is favored over neoplasm.  5) intrahepatic     and extrahepatic biliary duct dilation.            Biopsy of anal mass on 2020.  Pathology was positive for adenocarcinoma,     poorly differentiated.            CT of the chest on 2020 showed a 3 mm nodule in the right lower lobe and a     3 mm nodule in the left upper lobe.            Patient was taken to the operating room for port placement and a diverting     colostomy on 2020.  I was called on 2020 when the patient had MRI of     the abdomen and MRCP and inform that she had evidence of a small bowel     obstruction.  I contacted the patient and sent her to the hospital for     evaluation and treatment.  She was admitted from 2020 to 2020 and had     to return to the operating room for lysis of adhesions.            20:        MRI abdomen:      1. Fluid distended  esophagus, stomach, and small bowel highly suspicious for     high-grade small bowel obstruction.  A transition point is not clearly     identified in part due to MR technique and field of view.  CT may be helpful for    further evaluation of the exact location of a suspected transition point.      2. Abnormal predominantly extrahepatic biliary ductal dilatation.  No definite     choledocholithiasis is present however it would be difficult to fully exclude     due to the quality of the exam.  Ampullary stenosis or stricture could be     present.  If patient has abnormal liver function tests or an elevated bilirubin,    further evaluation with ERCP could be considered for definitive evaluation.      3. Cholelithiasis.              MRI pelvis:       1. Locally advanced low rectal cancer with significant involvement of the anal     sphincteric complex and localized invasion of the vagina.  Findings are     compatible with T4b tumor.      2. Bulbous appearance of the urethra raises question of possible urethral     involvement with tumor.  Correlate clinically for symptoms of urinary     retention/obstruction.      3. Bilateral inguinal adenopathy compatible with ryne involvement.      4. Nonspecific pelvic subcutaneous edema and vulvar edema.             CT abdomen/pelvis on 8/7/2020:      CONCLUSION:         1. High-grade small bowel obstruction with a discrete transition zone within the    region of the mid jejunum in the left lower quadrant of the abdomen.      2. Intrahepatic and extrahepatic biliary ductal dilatation without a discrete     mass or stone identified.      3. Ulcerating mass involving the rectum, perineum, and vagina with metastatic     bilateral inguinal adenopathy            Cycle 1 of FOLFOX on 8/31/2020            HPI - Oncology Interim      Patient comes in today with her  prior to the start of her first cycle of    chemotherapy.  She says that she continues to have bright red blood per  rectum.     However her ostomy output is soft and normal.  Her pain is improved on     hydrocodone which she takes every 4 hours.  She says it typically only last 3     hours and she has to wake up in the middle the night to take the medication.      She is also taking oral iron supplementation and is tolerating it fairly well.            Clinical Staging      clinical stage at least T4N2            ECOG Performance Status      1            Most Recent Lab Findings      Laboratory Tests      8/18/20 05:30            PAST, FAMILY   Past Medical History      Past Medical History:  None      Hematology/Oncology (F):  None      Other Hematology History:        Rectal cancer.      Genetic/Metabolic:  None            Past Surgical History      Appendectomy, Biopsy, Hysterectomy            Family History      Family History:  Lung Cancer (FATHER)            Social History      Marital Status:  Single      Lives independently:  Yes      Number of Children:  2      Occupation:  RETIRED            Tobacco Use      Tobacco status:  Never smoker            Alcohol Use      Alcohol intake:  0-2 drinks per day            Substance Use      Substance use:  Denies use            REVIEW OF SYSTEMS      General:  Admits: Fatigue, Weight Loss;          Denies: Appetite Change, Fever, Night Sweats, Weight Gain      Eye:  Admits Corrective Lenses; Denies Blurred Vision, Denies Diplopia, Denies     Vision Changes      ENT:  Denies Headache, Denies Hearing Loss, Denies Hoarseness, Denies Sore     Throat      Cardiovascular:  Denies Chest Pain, Denies Palpitations      Respiratory:  Denies: Cough, Coughing Blood, Productive Cough, Shortness of Air,    Wheezing      Gastrointestinal:  Denies Bloody Stools, Denies Constipation, Denies Diarrhea,     Denies Nausea/Vomiting, Denies Problem Swallowing, Denies Unable to Control     Bowels      Genitourinary:  Denies Blood in Urine, Denies Incontinence, Denies Painful     Urination       Musculoskeletal:  Admits Back Pain; Denies Muscle Pain, Denies Painful Joints      Integumentary:  Denies Itching, Denies Lesions, Denies Rash      Neurologic:  Denies Dizziness, Denies Numbness\Tingling, Denies Seizures      Psychiatric:  Denies Anxiety, Denies Depression      Endocrine:  Denies Cold Intolerance, Denies Heat Intolerance      Hematologic/Lymphatic:  Denies Bruising, Denies Bleeding, Denies Enlarged Lymph     Nodes      Reproductive:  Denies: Menopause, Heavy Periods, Pregnant, Still Menstruating            VITAL SIGNS AND SCORES      Vitals      Weight 117 lbs 4.556 oz / 53.2 kg      Temperature 99.8 F / 37.67 C - Temporal      Pulse 96      Respirations 20      Blood Pressure 107/56 Sitting      Pulse Oximetry 100%, ROOM AIR            Pain Score      Experiencing any pain?:  Yes      Pain Scale Used:  Numerical      Pain Intensity:  7      Pain Comment:        BACK            Fatigue Score      Experiencing any fatigue?:  Yes      Fatigue (0-10 scale):  5            PT/OT Functional Screening      No needs identified            Speech Functional Screening      No needs identified            Rehab to be Ordered      Type of Referral to be Ordered:  No needs identified            EXAM      General: Alert, cooperative, chronically ill-appearing and cachectic      Eyes: Anicteric sclera, PERRLA      Respiratory: CTAB, normal respiratory effort      Abdomen: Normal active bowel sounds, no tenderness, no distention      Cardiovascular: RRR, no murmur, no peripheral edema      Skin: Normal tone, no rash, no lesions      Psychiatric: Appropriate affect, intact judgment      Neurologic: No focal sensory or motor deficits, no weakness, numbness, dizziness      Musculoskeletal: Normal muscle strength, normal muscle tone      Extremities: No clubbing, cyanosis, or deformities            PREVENTION      Hx Influenza Vaccination:  No      Influenza Vaccine Declined:  Yes      2 or More Falls in Past Year?:   No      Fall Past Year with Injury?:  No      Encouraged to follow-up with:  PCP regarding preventative exams.      Chart initiated by      JO-ANN BROWN MA            ALLERGY/MEDS      Allergies      Coded Allergies:             NO KNOWN ALLERGIES (Verified , 8/31/20)            Medications      Last Reconciled on 8/31/20 13:01 by FADIA CENTENO      Morphine Sulfate ER (Morphine Sulfate ER) 15 Mg Tablet.er      15 MG PO Q12HR, #60 TAB         Prov: FADIA CENTENO         8/31/20       Loperamide (Loperamide) 2 Mg Capsule      2 MG PO ASDIR, #60 TAB 5 Refills         Prov: FADIA CENTENO         8/25/20       Ondansetron Hcl (ONDANSETRON HCL) 8 Mg Tablet      8 MG PO Q8H PRN for NAUSEA, #30 TAB 3 Refills         Prov: FADIA CENTENO         8/20/20       Prochlorperazine (Compazine) 5 Mg Tab      10 MG PO TID for NAUSEA, #30 TAB 3 Refills         Prov: FADIA CENTENO         8/20/20       Zinc Oxide 20% Ointment (Zinc Oxide 20% Oint*) 28.35 Gm Oint...g.      1 APL TOPICAL TID for 30 Days, #1 TUBE         Prov: Billy Murray         8/18/20       Docusate Sodium (Docusate Sodium) 100 Mg Capsule      100 MG PO QDAY, CAP         Reported         8/7/20       HYDROcodone-Acetaminophen 5-325 Mg (HYDROcodone-Acetaminophen 5-325 Mg) 1 Each     Tablet      1-2 TAB PO Q4H PRN for PAIN, #36 TAB         Prov: CHRIS DANIELS         7/31/20       Polyethylene Glycol (Miralax*) 17 Gm Packet      17 GM PO QDAY, #30 PKT 0 Refills         Reported         7/21/20      Medications Reviewed:  No Changes made to meds            IMPRESSION/PLAN      Impression      65-year-old female with very locally advanced rectal cancer here for her first     cycle of chemotherapy            Diagnosis      Rectal cancer - C20            Small bowel obstruction - K56.609            S/P colostomy - Z93.3            Anemia - D64.9            Notes      New Medications      * Morphine Sulfate ER 15 MG TABLET.ER: 15 MG PO Q12HR #60      New Diagnostics       * Ferritin Level, Routine         Dx: Rectal cancer - C20      * Iron Profile, Routine         Dx: Rectal cancer - C20            Plan      Rectal adenocarcinoma: Patient has extensive disease throughout the pelvis with     local invasion into adjacent structures.  She underwent diverting ostomy and was    admitted for complication of adhesions and small bowel obstruction.  She is now     out of the hospital and feeling somewhat better.  Ostomy output is normal but     she continues to complain of bright red blood per rectum.  I reviewed her labs     and they are adequate for her to proceed to treatment today with her first cycle    of FOLFOX.  If her disease can be better controlled with 4-6 cycles of     chemotherapy then I will discuss the treatment plan again with Dr. Garay and     consider concurrent radiation.  Currently the patient is too symptomatic to     tolerate concurrent chemoradiation.            Rectal pain: Patient's pain is improved with hydrocodone 5 mg every 4 hours.      However the pain medication only lasts about 3 hours and she has to wake up to     take it in the middle of the night.  I will start her on 15 mg MS Contin twice a    day.            Microcytic anemia: Likely secondary to iron deficiency as a result of persistent    rectal bleeding.  I will check iron studies today and plan for IV iron     supplementation if needed.            Patient Education      Patient Education Provided:  Yes            Electronically signed by FADIA CENTENO  08/31/2020 13:01       Disclaimer: Converted document may not contain table formatting or lab diagrams. Please see [employer] [facility] for the authenticated document.

## 2021-05-28 NOTE — PROGRESS NOTES
Patient: ROBERTO CARLOS LANE     Acct: [id number]     Report: #[id number]  UNIT #: [id number]     : 1955    Encounter Date:2021  PRIMARY CARE:   ***Signed***  --------------------------------------------------------------------------------------------------------------------  TELEHEALTH NOTE      Past Oncology Illness History      Locally Advanced Rectal cancer:             - Referred to me by Dr. Silvia Granados.       - CT of the abdomen/pelvis on 7/10/2020 showed 1) diffuse wall thickening in the    distal rectum.  Heterogeneous enhancement of the perineum.  2) inguinal     adenopathy measuring up to 2.5 cm.  3) 3 mm noncalcified right lower lobe nodul    e.  4) wall thickening in the fundus of the gallbladder with faint     calcification.  Focal adenomatosis is favored over neoplasm.  5) intrahepatic     and extrahepatic biliary duct dilation.      - Biopsy of anal mass on 2020.  Pathology was positive for adenocarcinoma,     poorly differentiated.      - CT of the chest on 2020 showed a 3 mm nodule in the right lower lobe and     a 3 mm nodule in the left upper lobe.      - diverting colostomy on 2020 complicated by small bowel obstruction due to    adhesions, hospitalized from 2020 to 2020.      - MRI pelvis 2020: 1. Locally advanced low rectal cancer with significant     involvement of the anal sphincteric complex and localized invasion of the     vagina.  Findings are compatible with T4b tumor. 2. Bulbous appearance of the     urethra raises question of possible urethral involvement with tumor. 3.     Bilateral inguinal adenopathy compatible with ryne involvement. 4. Nonspecific     pelvic subcutaneous edema and vulvar edema.             Cycle 1-5 of FOLFOX from 2020 to 10/26/2020             CT CAP on 2020: New 6 mm noncalcified nodule in the right lower lobe     suspicious for metastatic disease.  Mild splenomegaly.  Moderate intra and     extrahepatic  bile duct dilation unchanged.  Ill-defined mass in the rectum and     perineum is smaller.  This mass previously measured 5.6 cm x 7.5 cm and now     measures 5.2 cm x 4 cm, indicating it has shrunk by at least half.            Cycle 6: 11/9/2020             Chemotherapy held on 11/23/2020 due to elevated AST/ALT and worsening pain.  She    is having worsening progression of local disease despite CT scan showing     improvement in the size of the primary mass.             Cycle 7: 12/7/20 (10% reduction in 5FU due to prior transaminitis)             Started 5-FU with concurrent radiation to the rectum and anal canal on     12/21/2020            History of Present Illness            Chief Complaint: () RECTAL CANCER             Yao Mcclure is presenting for evaluation via Telehealth visit by phone.     Verbal consent obtained before beginning visit.            Provider spent (12) minutes with the patient during telehealth visit.            The following staff were present during the visit: (Addie Vasquez RN)                         Most Recent Lab Findings      Laboratory Tests      12/21/20 10:30            12/21/20 10:45            Laboratory Tests            Test       12/21/20      10:30             Magnesium Level       2.30 mg/dL      (1.60-2.30)            Allergies/Medications      Allergies:        Coded Allergies:             NO KNOWN ALLERGIES (Verified , 12/21/20)      Medications    Last Reconciled on 1/4/21 17:24 by FADIA CENTENO      Gabapentin (Gabapentin) 300 Mg Capsule      300 MG PO TID, #90 CAP 5 Refills         Prov: FADIA CENTENO         12/21/20       oxyCODONE IR (oxyCODONE IR) 10 Mg Tablet      10 MG PO Q3H PRN for PAIN, #120 TAB         Prov: FADIA CENTENO         12/21/20       Morphine Sulfate ER (Morphine Sulfate ER) 30 Mg Tablet.er      30 MG PO Q8 for 30 Days, #90 TAB         Prov: FADIA CENTENO         12/21/20       Mirtazapine (Mirtazapine*) 15 Mg Tablet      15 MG PO HS for 30  Days, #30 TAB 5 Refills         Prov: FADIA CENTENO         11/24/20       Doxycycline Monohydrate (Doxycycline Monohydrate) 100 Mg Capsule      100 MG PO BID for 7 Days, #14 CAP         Prov: FADIA CENTENO         11/23/20       Polyethylene Glycol (Miralax*) 17 Gm Packet      17 GM PO QDAY, #30 PKT 0 Refills         Prov: FADIA CENTENO         9/14/20       Loperamide (Loperamide) 2 Mg Capsule      2 MG PO ASDIR, #60 TAB 5 Refills         Prov: FADIA CENTENO         8/25/20       Ondansetron Hcl (ONDANSETRON HCL) 8 Mg Tablet      8 MG PO Q8H PRN for NAUSEA, #30 TAB 3 Refills         Prov: FADIA CENTENO         8/20/20       Prochlorperazine (Compazine) 5 Mg Tab      10 MG PO TID for NAUSEA, #30 TAB 3 Refills         Prov: FADIA CENTENO         8/20/20       Zinc Oxide 20% Ointment (Zinc Oxide 20% Ointment) 28.35 Gm Oint...g.      1 APL TOPICAL TID for 30 Days, #1 TUBE         Prov: Billy Murray         8/18/20       Docusate Sodium (Docusate Sodium) 100 Mg Capsule      100 MG PO QDAY, CAP         Reported         8/7/20            Plan/Instructions      Ambulatory Assessment/Plan:        Rectal cancer - C20            Anemia due to chemotherapy - D64.81, T45.1X5A            Notes      New Diagnostics      * CBC With Auto Diff, 01/15/21         Dx: Rectal cancer - C20      * Comp Metabolic Panel, 01/15/21         Dx: Rectal cancer - C20      Plan/Instructions            * Plan Of Care: (Rectal cancer)      * Rectal cancer: I contacted the patient today 2 follow-up after she started       concurrent chemoradiation.  She has not had recent labs.  I will plan to check      CBC and CMP on Friday, January 15 and follow-up with her on January 18.  The       plan will be for her 2 start FOLFIRI after she completes chemoradiation.      * Malnutrition weight loss: Patient was very happy report that she has gained 5       pounds since we last spoke.  She is now up to 121 pounds which is closer 2 her      goal of 130.   Her appetite has improved and she generally feels better.      * Rectal pain: Force of the patient's rectal pain is still very significant.        However, gabapentin has helped.  She is taking 1 300 mg tablet 3 times a day.       She is able sleep for the first time in several weeks.  She is also taking MS       Contin 30 mg twice a day and oxycodone 10 mg every 3 4 hours as needed.  She       does not wish the change her current pain regimen.  She understands that she       may get better control by taking MS Contin 3 times a day but does not wish the      be sleepy or fatigued.            * Chronic conditions reviewed and taken into consideration for today's treatment      plan.      * Patient instructed to seek medical attention urgently for new or worsening       symptoms.      * Patient was educated/instructed on their diagnosis, treatment and medications       prior to discharge from the clinic today.      Codes:  Phone Eval 11-20 mi [id number]            Electronically signed by FADIA CENTENO  01/04/2021 17:24       Disclaimer: Converted document may not contain table formatting or lab diagrams. Please see [employer] for the authenticated document.

## 2021-05-28 NOTE — PROGRESS NOTES
Patient: ROBERTO CARLOS LANE     Acct: [id number]     Report: #[id number]  UNIT #: [id number]     : 1955    Encounter Date:2020  PRIMARY CARE: *SHOLA LARIOS brayan  ***Signed***  --------------------------------------------------------------------------------------------------------------------  NURSE INTAKE      Chief Complaint      METASTATIC RECTAL CANCER            Referring Provider/Copies To      Referring Provider:  JOY AMADOR MD            History and Present Illness      Past Oncology Illness History      Ms. Lane comes in today with her  to discuss her new diagnosis of Rectal    cancer.  She was referred to me by Dr. Joy Granados.  The patient states     she has had significant rectal pain and some bleeding for several months.  She     initially thought it was due to hemorrhoids.  She would alleviate the pain by     doing Epsom salt baths.  However more recently the pain became so severe that     she had to seek medical attention.  The Epsom salt baths now make the pain     worse.             CT of the abdomen/pelvis on 7/10/2020 showed 1) diffuse wall thickening in the     distal rectum.  Heterogeneous enhancement of the perineum.  2) inguinal     adenopathy measuring up to 2.5 cm.  3) 3 mm noncalcified right lower lobe     nodule.  4) wall thickening in the fundus of the gallbladder with faint     calcification.  Focal adenomatosis is favored over neoplasm.  5) intrahepatic     and extrahepatic biliary duct dilation.            Patient was referred to Dr. Joy Granados.  She performed a biopsy of the     patient's anal mass on 2020.  Pathology was positive for adenocarcinoma,     poorly differentiated.            CT of the chest on 2020 showed a 3 mm nodule in the right lower lobe and a     3 mm nodule in the left upper lobe.            Patient was taken to the operating room for port placement and a diverting     colostomy on 2020.  I was called on  8/7/2020 when the patient had MRI of     the abdomen and MRCP and inform that she had evidence of a small bowel o    bstruction.  I contacted the patient and sent her to the hospital for evaluation    and treatment.  She was admitted from 8/7/2020 to 8/18/2020 and had to return to    the operating room for lysis of adhesions.            8/7/20:        MRI abdomen:      1. Fluid distended esophagus, stomach, and small bowel highly suspicious for     high-grade small bowel obstruction.  A transition point is not clearly     identified in part due to MR technique and field of view.  CT may be helpful for    further evaluation of the exact location of a suspected transition point.      2. Abnormal predominantly extrahepatic biliary ductal dilatation.  No definite     choledocholithiasis is present however it would be difficult to fully exclude     due to the quality of the exam.  Ampullary stenosis or stricture could be     present.  If patient has abnormal liver function tests or an elevated bilirubin,    further evaluation with ERCP could be considered for definitive evaluation.      3. Cholelithiasis.              MRI pelvis:       1. Locally advanced low rectal cancer with significant involvement of the anal     sphincteric complex and localized invasion of the vagina.  Findings are     compatible with T4b tumor.      2. Bulbous appearance of the urethra raises question of possible urethral     involvement with tumor.  Correlate clinically for symptoms of urinary     retention/obstruction.      3. Bilateral inguinal adenopathy compatible with ryne involvement.      4. Nonspecific pelvic subcutaneous edema and vulvar edema.             CT abdomen/pelvis on 8/7/2020:      CONCLUSION:         1. High-grade small bowel obstruction with a discrete transition zone within the    region of the mid jejunum in the left lower quadrant of the abdomen.      2. Intrahepatic and extrahepatic biliary ductal dilatation without a  discrete     mass or stone identified.      3. Ulcerating mass involving the rectum, perineum, and vagina with metastatic     bilateral inguinal adenopathy            HPI - Oncology Interim      Patient comes in today with her  to discuss the plan of care after     hospital discharge.  She said that she is slowly increasing the amount of food     that she can eat.  She ate a whole cup of yogurt earlier today but now is     experiencing some heartburn.  We discussed treatment options as before.  However    in the intervening time I discussed her case with Dr. Garay and we agreed that     she should have sequential chemotherapy and radiation but not concurrent due to     the extent of her disease.  I discussed chemotherapy with FOLFOX.  I would not     recommend Xeloda as it is an oral medication and could lead to more side effects    given her current situation.            Clinical Staging      clinical stage at least T4N2            ECOG Performance Status      1            Most Recent Lab Findings      Laboratory Tests      8/13/20 11:00            8/18/20 05:30            Laboratory Tests            Test       8/12/20      09:20             Magnesium Level       1.98 mg/dL      (1.60-2.30)            PAST, FAMILY   Past Medical History      Past Medical History:  None      Hematology/Oncology (F):  None      Genetic/Metabolic:  None            Past Surgical History      Appendectomy, Biopsy, Hysterectomy            Family History      Family History:  Lung Cancer (FATHER)            Social History      Marital Status:  Single      Lives independently:  Yes      Number of Children:  2      Occupation:  RETIRED            Tobacco Use      Tobacco status:  Never smoker            Alcohol Use      Alcohol intake:  0-2 drinks per day            Substance Use      Substance use:  Denies use            REVIEW OF SYSTEMS      General:  Admits: Fatigue, Weight Loss;          Denies: Appetite Change, Fever, Night  Sweats, Weight Gain      Eye:  Denies Blurred Vision, Denies Corrective Lenses, Denies Diplopia, Denies     Vision Changes      ENT:  Denies Headache, Denies Hearing Loss, Denies Hoarseness, Denies Sore     Throat      Cardiovascular:  Denies Chest Pain, Denies Palpitations      Other      Significant edema in the lower extremities      Respiratory:  Denies: Cough, Coughing Blood, Productive Cough, Shortness of Air,    Wheezing      Gastrointestinal:  Admits Bloody Stools; Denies Constipation, Denies Diarrhea,     Denies Nausea/Vomiting, Denies Problem Swallowing, Denies Unable to Control     Bowels      Other      Heartburn      Genitourinary:  Denies Blood in Urine, Denies Incontinence, Denies Painful     Urination      Musculoskeletal:  Denies Back Pain, Denies Muscle Pain, Denies Painful Joints      Integumentary:  Denies Itching, Denies Lesions, Denies Rash      Neurologic:  Denies Dizziness, Denies Numbness\Tingling, Denies Seizures      Psychiatric:  Denies Anxiety, Denies Depression      Endocrine:  Denies Cold Intolerance, Denies Heat Intolerance      Hematologic/Lymphatic:  Denies Bruising, Denies Bleeding, Denies Enlarged Lymph     Nodes      Reproductive:  Denies: Menopause, Heavy Periods, Pregnant, Still Menstruating            VITAL SIGNS AND SCORES      Vitals      Weight 114 lbs 10.227 oz / 52.0 kg      Temperature 96.8 F / 36 C - Temporal      Pulse 86      Respirations 16      Blood Pressure 108/54 Sitting, Left Arm      Pulse Oximetry 100%, ROOM AIR            Pain Score      Experiencing any pain?:  No      Pain Scale Used:  Numerical      Pain Intensity:  0            Fatigue Score      Experiencing any fatigue?:  Yes      Fatigue (0-10 scale):  8            EXAM      General: Alert, cooperative, very cachectic but standing throughout the entire     exam due to pain      Eyes: Anicteric sclera, PERRLA      HEENT: Oropharynx clear, no exudates      Chest: Port placed in the right anterior chest,  no erythema or exudates      Respiratory: CTAB, normal respiratory effort      Abdomen: Normal active bowel sounds, no tenderness, no distention      Cardiovascular: RRR, no murmur, 2+ pitting edema of the lower extremities      Skin: Normal tone, no rash, no lesions      Psychiatric: Appropriate affect, intact judgment      Neurologic: No focal sensory or motor deficits, no weakness, numbness, dizziness      Musculoskeletal: Normal muscle strength, normal muscle tone      Extremities: No clubbing, cyanosis, or deformities            PREVENTION      Hx Influenza Vaccination:  No      Influenza Vaccine Declined:  Yes      2 or More Falls in Past Year?:  No      Fall Past Year with Injury?:  No      Encouraged to follow-up with:  PCP regarding preventative exams.      [facility]      [facility]            ALLERGY/MEDS      Allergies      Coded Allergies:             NO KNOWN ALLERGIES (Verified , 8/9/20)            Medications      Last Reconciled on 8/20/20 17:16 by FADIA CENTENO      Ondansetron Hcl (ONDANSETRON HCL) 8 Mg Tablet      8 MG PO Q8H PRN for NAUSEA, #30 TAB 3 Refills         Prov: FADIA CENTENO         8/20/20       Prochlorperazine (Compazine) 5 Mg Tab      10 MG PO TID for NAUSEA, #30 TAB 3 Refills         Prov: FADIA CENTENO         8/20/20       Zinc Oxide 20% Ointment (Zinc Oxide 20% Oint*) 28.35 Gm Oint...g.      1 APL TOPICAL TID for 30 Days, #1 TUBE         Prov: Billy Murray         8/18/20       HYDROcodone-Acetaminophen 5-325 Mg (HYDROcodone-Acetaminophen 5-325 Mg) 1 Each     Tablet      2 TAB PO Q4H PRN for PAIN, #30 TAB         Prov: JOY AMADOR MD         8/18/20       Docusate Sodium (Docusate Sodium) 100 Mg Capsule      100 MG PO QDAY, CAP         Reported         8/7/20       HYDROcodone-Acetaminophen 5-325 Mg (HYDROcodone-Acetaminophen 5-325 Mg) 1 Each     Tablet      1-2 TAB PO Q4H PRN for PAIN, #36 TAB         Prov: CHRIS DANIELS         7/31/20        Polyethylene Glycol (Miralax*) 17 Gm Packet      17 GM PO QDAY, #30 PKT 0 Refills         Reported         7/21/20      Medications Reviewed:  No Changes made to meds            IMPRESSION/PLAN      Impression      65-year-old female with very locally advanced and probably metastatic rectal     cancer.  She was hospitalized after diverting ostomy for small bowel     obstruction.  She had to return to the operating room for adhesion lysis.  She     is now back home and slowly increasing her diet.            Diagnosis      Rectal cancer - C20            Notes      New Medications      * Prochlorperazine (Compazine) 5 MG TAB: 10 MG PO TID NAUSEA #30      * ONDANSETRON HCL 8 MG TABLET: 8 MG PO Q8H PRN NAUSEA #30      New Diagnostics      * Iron Profile, Week         Dx: Rectal cancer - C20      * Ferritin Level, Week         Dx: Rectal cancer - C20            Plan      Rectal adenocarcinoma: Patient has extensive locally advanced rectal cancer with    involvement of the anal sphincter and invasion into the vagina and also likely     the urethra.  She has no dave evidence of distant metastatic disease but does     have dilated extrahepatic biliary ducts with no definitive choledocholithiasis.     I explained to the patient and her  that her disease is not curable at     this point due to the extent of local invasion.  In fact Dr. Garay and I     believe that concurrent chemoradiation would be too toxic.  Prior to the     placement of ostomy and her small bowel obstruction I had talked with patient     about FOLFOX versus CAPEOX chemotherapy but at this time I feel capecitabine     would not be well tolerated given that it is an oral medication.  I discussed     the risk and benefits to FOLFOX chemotherapy with the patient and she was     agreeable.  I will try to start her treatment early next week if possible.            Malnutrition: Patient lost significant amounts of weight prior to her diagnosis     and  this is only been exacerbated by her small bowel obstruction and     hospitalization.  She is slowly increasing her oral intake and is trying to     increase calories.  I discussed the use of supplemental shakes.  The patient     will be meeting with nutrition throughout her treatment.            Rectal pain: Secondary to her underlying malignancy.  Patient is currently     taking hydrocodone 5/325 mg.  I have also started MS Contin 15 mg twice daily     which may need to be increased when treatment starts.            Patient Education      Patient Education Provided:  Yes            Electronically signed by FADIA CENTENO  08/20/2020 17:16       Disclaimer: Converted document may not contain table formatting or lab diagrams. Please see [employer] for the authenticated document.

## 2021-05-28 NOTE — PROGRESS NOTES
Patient: ROBERTO CARLOS LANE     Acct: [id number]     Report: #[id number]  UNIT #: [id number]     : 1955    Encounter Date:2020  PRIMARY CARE: *SHOLA LARIOS brayan  ***Signed***  --------------------------------------------------------------------------------------------------------------------  NURSE INTAKE      Visit Type      Established Patient Visit            Chief Complaint      RECTAL CANCER            Referring Provider/Copies To      Referring Provider:  JOY AMADOR MD            History and Present Illness      Past Oncology Illness History      Ms. Lane comes in today with her  to discuss her new diagnosis of Rectal    cancer.  She was referred to me by Dr. Joy Granados.  The patient states     she has had significant rectal pain and some bleeding for several months.  She     initially thought it was due to hemorrhoids.  She would alleviate the pain by     doing Epsom salt baths.  However more recently the pain became so severe that     she had to seek medical attention.  The Epsom salt baths now make the pain     worse.             CT of the abdomen/pelvis on 7/10/2020 showed 1) diffuse wall thickening in the     distal rectum.  Heterogeneous enhancement of the perineum.  2) inguinal     adenopathy measuring up to 2.5 cm.  3) 3 mm noncalcified right lower lobe     nodule.  4) wall thickening in the fundus of the gallbladder with faint     calcification.  Focal adenomatosis is favored over neoplasm.  5) intrahepatic     and extrahepatic biliary duct dilation.            Patient was referred to Dr. Joy Granados.  She performed a biopsy of the     patient's anal mass on 2020.  Pathology was positive for adenocarcinoma,     poorly differentiated.            CT of the chest on 2020 showed a 3 mm nodule in the right lower lobe and a     3 mm nodule in the left upper lobe.            Patient was taken to the operating room for port placement and a diverting co     lostomy on 7/29/2020.  I was called on 8/7/2020 when the patient had MRI of the     abdomen and MRCP and inform that she had evidence of a small bowel obstruction.     I contacted the patient and sent her to the hospital for evaluation and     treatment.  She was admitted from 8/7/2020 to 8/18/2020 and had to return to the    operating room for lysis of adhesions.            8/7/20:        MRI abdomen:      1. Fluid distended esophagus, stomach, and small bowel highly suspicious for     high-grade small bowel obstruction.  A transition point is not clearly     identified in part due to MR technique and field of view.  CT may be helpful for    further evaluation of the exact location of a suspected transition point.      2. Abnormal predominantly extrahepatic biliary ductal dilatation.  No definite     choledocholithiasis is present however it would be difficult to fully exclude     due to the quality of the exam.  Ampullary stenosis or stricture could be     present.  If patient has abnormal liver function tests or an elevated bilirubin,    further evaluation with ERCP could be considered for definitive evaluation.      3. Cholelithiasis.              MRI pelvis:       1. Locally advanced low rectal cancer with significant involvement of the anal     sphincteric complex and localized invasion of the vagina.  Findings are     compatible with T4b tumor.      2. Bulbous appearance of the urethra raises question of possible urethral     involvement with tumor.  Correlate clinically for symptoms of urinary     retention/obstruction.      3. Bilateral inguinal adenopathy compatible with ryne involvement.      4. Nonspecific pelvic subcutaneous edema and vulvar edema.             CT abdomen/pelvis on 8/7/2020:      CONCLUSION:         1. High-grade small bowel obstruction with a discrete transition zone within the    region of the mid jejunum in the left lower quadrant of the abdomen.      2. Intrahepatic and  extrahepatic biliary ductal dilatation without a discrete     mass or stone identified.      3. Ulcerating mass involving the rectum, perineum, and vagina with metastatic     bilateral inguinal adenopathy            HPI - Oncology Interim      1) Metastatic rectal cancer: diagnosed: 7/14/2020: anal mass biopsy: Poorly     differentiated adenocarcinoma: 8/11/2020: SB resection secondary to adhesions.             Mrs. Yao Mcclure presents  with her  for chemotherapy education.             2) Chemotherapy education: FOLFOX ( Oxaliplatin, 5FU) every 2 weeks until     unacceptable toxicities or progression:             Mrs. Mcclure and her  presents for chemotherapy education today. We discuss    ed chemotherapy side effects including cold sensitivity, peripheral neuropathy,     risk for infection, cytopenia's, dehydration, electrolyte abnormalities, renal     and liver toxicities and fatigue. In addition, we discussed importance of     nutrition, oral fluids, mouth care, skin sensitivity, diarrhea and chronic pain.    We discussed emesis prevention with taking Odansetron, Compazine, and how to use    these as needed. We discussed the use of Immodium PRN for loose stools as well.     Currently, she is taking Miralax and Colace for constipation. We discussed the     use Neulasta injection Onpro as well to prevent any neutropenic fevers.             We discussed importance of nutrition, fluids. We discussed fatigue.             We discussed how to use the emesis meds with Zofran and Compazine. We discussed     the use of Immodium for loose stools.             We discussed reason to go to ER. We discussed how to call into the clinic and     speak to triage nurse. We discussed reasons to call the clinic.             3) Chronic pain: Patient is taking medications. She is has a call into Dr. Teresa office for pain refills she states.            Clinical Staging      clinical stage at least T4N2             ECOG Performance Status      1            Most Recent Lab Findings      Laboratory Tests      8/18/20 05:30            PAST, FAMILY   Past Medical History      Past Medical History:  None      Hematology/Oncology (F):  None      Other Hematology History:        Rectal cancer.      Genetic/Metabolic:  None            Past Surgical History      Appendectomy, Biopsy, Hysterectomy            Family History      Family History:  Lung Cancer (FATHER)            Social History      Marital Status:  Single      Lives independently:  Yes      Number of Children:  2      Occupation:  RETIRED            Tobacco Use      Tobacco status:  Never smoker            Alcohol Use      Alcohol intake:  0-2 drinks per day            Substance Use      Substance use:  Denies use            REVIEW OF SYSTEMS      General:  Admits: Fatigue, Weight Loss;          Denies: Appetite Change, Fever, Night Sweats, Weight Gain      Eye:  Denies Blurred Vision, Denies Corrective Lenses, Denies Diplopia, Denies     Vision Changes      ENT:  Denies Headache, Denies Hearing Loss, Denies Hoarseness, Denies Sore     Throat      Cardiovascular:  Denies Chest Pain, Denies Palpitations      Respiratory:  Denies: Cough, Coughing Blood, Productive Cough, Shortness of Air,    Wheezing      Gastrointestinal:  Admits Constipation; Denies Bloody Stools, Denies Diarrhea,     Denies Nausea/Vomiting, Denies Problem Swallowing, Denies Unable to Control     Bowels      Genitourinary:  Denies Blood in Urine, Denies Incontinence, Denies Painful     Urination      Musculoskeletal:  Denies Back Pain, Denies Muscle Pain, Denies Painful Joints      Integumentary:  Denies Itching, Denies Lesions, Denies Rash      Neurologic:  Denies Dizziness, Denies Numbness\Tingling, Denies Seizures      Psychiatric:  Denies Anxiety, Denies Depression      Endocrine:  Denies Cold Intolerance, Denies Heat Intolerance      Hematologic/Lymphatic:  Denies Bruising, Denies Bleeding, Denies  Enlarged Lymph     Nodes      Reproductive:  Denies: Menopause, Heavy Periods, Pregnant, Still Menstruating            VITAL SIGNS AND SCORES      Vitals      Weight 116 lbs 6.446 oz / 52.8 kg      Temperature 97.8 F / 36.56 C - Temporal      Pulse 98      Respirations 20      Blood Pressure 107/70 Standing, Left Arm      Pulse Oximetry 96%, ROOM AIR            Pain Score      Experiencing any pain?:  Yes      Pain Scale Used:  Numerical      Pain Intensity:  10 (RECTAL)            Fatigue Score      Experiencing any fatigue?:  No      Fatigue (0-10 scale):  0 (none)            PT/OT Functional Screening      No needs identified            Speech Functional Screening      No needs identified            Rehab to be Ordered      Type of Referral to be Ordered:  No needs identified            EXAM      No exam completed. Chemotherapy education completed.            PREVENTION      Hx Influenza Vaccination:  No      Influenza Vaccine Declined:  Yes      2 or More Falls in Past Year?:  No      Fall Past Year with Injury?:  No      Encouraged to follow-up with:  PCP regarding preventative exams.      Chart initiated by      DAINA LANGFORD            ALLERGY/MEDS      Allergies      Coded Allergies:             NO KNOWN ALLERGIES (Verified , 8/28/20)            Medications      Last Reconciled on 8/28/20 11:44 by POLINA WANG      Loperamide (Loperamide) 2 Mg Capsule      2 MG PO ASDIR, #60 TAB 5 Refills         Prov: FADIA CENTENO         8/25/20       Ondansetron Hcl (ONDANSETRON HCL) 8 Mg Tablet      8 MG PO Q8H PRN for NAUSEA, #30 TAB 3 Refills         Prov: FADIA CENTENO         8/20/20       Prochlorperazine (Compazine) 5 Mg Tab      10 MG PO TID for NAUSEA, #30 TAB 3 Refills         Prov: FADIA CENTENO         8/20/20       Zinc Oxide 20% Ointment (Zinc Oxide 20% Oint*) 28.35 Gm Oint...g.      1 APL TOPICAL TID for 30 Days, #1 TUBE         Prov: Billy Murray         8/18/20        HYDROcodone-Acetaminophen 5-325 Mg (HYDROcodone-Acetaminophen 5-325 Mg) 1 Each     Tablet      2 TAB PO Q4H PRN for PAIN, #30 TAB         Prov: JOY AMADOR MD         8/18/20       Docusate Sodium (Docusate Sodium) 100 Mg Capsule      100 MG PO QDAY, CAP         Reported         8/7/20       HYDROcodone-Acetaminophen 5-325 Mg (HYDROcodone-Acetaminophen 5-325 Mg) 1 Each     Tablet      1-2 TAB PO Q4H PRN for PAIN, #36 TAB         Prov: CHRIS DANIELS         7/31/20       Polyethylene Glycol (Miralax*) 17 Gm Packet      17 GM PO QDAY, #30 PKT 0 Refills         Reported         7/21/20      Medications Reviewed:  No Changes made to meds            IMPRESSION/PLAN      Impression      1) Metastatic rectal cancer: diagnosed: 7/14/2020: anal mass biopsy: Poorly     differentiated adenocarcinoma: 8/11/2020: SB resection secondary to adhesions.             Mrs. Yao Mcclure presents  with her  for chemotherapy education.             2) Chemotherapy education: FOLFOX ( Oxaliplatin, 5FU) every 2 weeks until     unacceptable toxicities or progression:             Mrs. Mcclure and her  presents for chemotherapy education today. We     discussed chemotherapy side effects including cold sensitivity, peripheral     neuropathy, risk for infection, cytopenia's, dehydration, electrolyte     abnormalities, renal and liver toxicities and fatigue. In addition, we discussed    importance of nutrition, oral fluids, mouth care, skin sensitivity, diarrhea and    chronic pain. We discussed emesis prevention with taking Odansetron, Compazine,     and how to use these as needed. We discussed the use of Immodium PRN for loose     stools as well. Currently, she is taking Miralax and Colace for constipation. We    discussed the use Neulasta injection Onpro as well to prevent any neutropenic     fevers.             We discussed importance of nutrition, fluids. We discussed fatigue.             We discussed how to use the emesis  meds with Zofran and Compazine. We discussed     the use of Immodium for loose stools.             We discussed reason to go to ER. We discussed how to call into the clinic and     speak to triage nurse. We discussed reasons to call the clinic.             3) Chronic pain: Patient is taking medications. She is has a call into Dr. Teresa office for pain refills she states.            Plan      1) Plan to start chemotherapy on Monday and will see Dr. Eubanks as well.             2) Chemotherapy education completed. They were given a copy of medication sheets    for Oxaliplatin and Fluorauracil. They were given a copy of the Chemotherapy and    you booklet to review.             All questions were answered and voice understanding of teaching. They were given    a tour in infusion area.             They were instructed to call for any questions or concerns.            Pain      Follow-up with PCP/Pain Management            Advanced Care Plan Discussion      Declines Discussion 1124F            Patient Education            Fluorouracil Injection      Loperamide      Ondansetron      Oxaliplatin Injection      Prochlorperazine      Patient Education Provided:  Yes            Electronically signed by POLINA WANG onc  08/28/2020 11:44       Disclaimer: Converted document may not contain table formatting or lab diagrams. Please see [facility] for the authenticated document.

## 2021-05-28 NOTE — PROGRESS NOTES
Patient: ROBERTO CARLOS LANE     Acct: [id number]     Report: #[id number]  UNIT #: [id number]     : 1955    Encounter Date:2020  PRIMARY CARE: *SHOLA LARIOS brayan  ***Signed***  --------------------------------------------------------------------------------------------------------------------  NURSE INTAKE      Visit Type      New Patient Visit            Chief Complaint      RECTAL MASS/POSSIBLE METS            Referring Provider/Copies To      Referring Provider:  JOY AMADOR MD            History and Present Illness      Past Oncology Illness History      Ms. Lane comes in today with her  to discuss her new diagnosis of Rectal    cancer.  She was referred to me by Dr. Joy Granados.  The patient states     she has had significant rectal pain and some bleeding for several months.  She     initially thought it was due to hemorrhoids.  She would alleviate the pain by     doing Epsom salt baths.  However more recently the pain became so severe that     she had to seek medical attention.  The Epsom salt baths now make the pain     worse.             CT of the abdomen/pelvis on 7/10/2020 showed 1) diffuse wall thickening in the     distal rectum.  Heterogeneous enhancement of the perineum.  2) inguinal     adenopathy measuring up to 2.5 cm.  3) 3 mm noncalcified right lower lobe     nodule.  4) wall thickening in the fundus of the gallbladder with faint     calcification.  Focal adenomatosis is favored over neoplasm.  5) intrahepatic     and extrahepatic biliary duct dilation.            Patient was referred to Dr. Joy Granados.  She performed a biopsy of the     patient's anal mass on 2020.  Pathology was positive for adenocarcinoma,     poorly differentiated.            CT of the chest on 2020 showed a 3 mm nodule in the right lower lobe and a     3 mm nodule in the left upper lobe.            Patient reports that Dr. Granados started her on pain medication,  hydrocodone    5 mg which she takes every 4 hours.  However the medication wears off in about 3    hours.  It does significantly help to alleviate her pain.  She says that she has    continued to lose weight over the past several months although she was unsure     how much.  She take stool softeners to help her go to the bathroom and says that    her stool is very loose.  Every bowel movement is very painful.            Clinical Staging      clinical stage at least T4N2            Most Recent Lab Findings      Laboratory Tests      7/10/20 13:05            PAST, FAMILY   Past Medical History      Past Medical History:  None      Hematology/Oncology (F):  None      Genetic/Metabolic:  None            Past Surgical History      Appendectomy, Biopsy, Hysterectomy            Family History      Family History:  Lung Cancer (FATHER)            Social History      Marital Status:  Single      Lives independently:  Yes      Number of Children:  2      Occupation:  RETIRED            Tobacco Use      Tobacco status:  Never smoker            Alcohol Use      Alcohol intake:  0-2 drinks per day            Substance Use      Substance use:  Denies use            REVIEW OF SYSTEMS      General:  Admits: Appetite Change, Weight Loss;          Denies: Fatigue, Fever, Night Sweats, Weight Gain      Eye:  Admits Corrective Lenses; Denies Blurred Vision, Denies Diplopia, Denies     Vision Changes      ENT:  Denies Headache, Denies Hearing Loss, Denies Hoarseness, Denies Sore     Throat      Cardiovascular:  Denies Chest Pain, Denies Palpitations      Respiratory:  Denies: Cough, Coughing Blood, Productive Cough, Shortness of Air,    Wheezing      Gastrointestinal:  Admits Diarrhea; Denies Bloody Stools, Denies Constipation,     Denies Nausea/Vomiting, Denies Problem Swallowing, Denies Unable to Control     Bowels      Other      Severe rectal pain      Genitourinary:  Denies Blood in Urine, Denies Incontinence, Denies Painful      Urination      Musculoskeletal:  Denies Back Pain, Denies Muscle Pain, Denies Painful Joints      Integumentary:  Denies Itching, Denies Lesions, Denies Rash      Neurologic:  Denies Dizziness, Denies Numbness\Tingling, Denies Seizures      Psychiatric:  Denies Anxiety, Denies Depression      Endocrine:  Denies Cold Intolerance, Denies Heat Intolerance      Hematologic/Lymphatic:  Denies Bruising, Denies Bleeding, Denies Enlarged Lymph     Nodes      Reproductive:  Denies: Menopause, Heavy Periods, Pregnant, Still Menstruating            VITAL SIGNS AND SCORES      Vitals      Height 5 ft 5.75 in / 167 cm      Weight 114 lbs 13.755 oz / 52.1 kg      BSA 1.58 m2      BMI 18.7 kg/m2      Temperature 99.5 F / 37.5 C - Temporal      Pulse 99      Respirations 16      Blood Pressure 143/79 Sitting      Pulse Oximetry 99%, ROOM AIR            Pain Score      Experiencing any pain?:  Yes (RECTAL AREA)      Pain Scale Used:  Numerical      Pain Intensity:  10            Fatigue Score      Experiencing any fatigue?:  No            EXAM      General: Alert, cooperative, very thin, standing throughout the exam in     conversation, appears in pain      Eyes: Anicteric sclera, PERRLA      HEENT: Oropharynx clear, no exudates      Respiratory: CTAB, normal respiratory effort      Abdomen: Normal active bowel sounds, no tenderness, no distention      Cardiovascular: RRR, no murmur, no peripheral edema      Skin: Normal tone, no rash, no lesions      Psychiatric: Appropriate affect, very anxious and sad about her diagnosis      Neurologic: No focal sensory or motor deficits, no weakness, numbness, dizziness      Musculoskeletal: Normal muscle strength, normal muscle tone      Extremities: No clubbing, cyanosis, or deformities            PREVENTION      Hx Influenza Vaccination:  No      Influenza Vaccine Declined:  Yes      2 or More Falls in Past Year?:  No      Fall Past Year with Injury?:  No      Chart initiated by       DION LANGFORD            ALLERGY/MEDS      Allergies      Coded Allergies:             NO KNOWN ALLERGIES (Verified , 7/21/20)            Medications      Last Reconciled on 7/21/20 17:35 by FADIA CENTENO      Morphine Sulfate ER (Morphine Sulfate ER) 15 Mg Tablet.er      15 MG PO Q12HR, #60 TAB         Prov: FADIA CENTENO         7/21/20       HYDROcodone-Acetaminophen 5-325 Mg (HYDROcodone-Acetaminophen 5-325 Mg) 1 Each     Tablet      1 TAB PO Q4H PRN for BREAKTHROUGH PAIN, #90 TAB 0 Refills         Prov: FADIA CENTENO         7/21/20       Polyethylene Glycol (Miralax*) 17 Gm Packet      17 GM PO QDAY, #30 PKT 0 Refills         Reported         7/21/20       HYDROcodone-Acetaminophen 5-325 Mg (Norco 5-325 Mg) 1 Each Tablet      1-2 TAB PO Q4H PRN for BREAKTHROUGH PAIN, #50 TAB 0 Refills         Prov: Chaz Xiong         7/17/20      Medications Reviewed:  No Changes made to meds            IMPRESSION/PLAN      Impression      65-year-old female with a new diagnosis of rectal adenocarcinoma with extensive     local progression.            Diagnosis      Rectal cancer - C20            Notes      New Medications      * POLYETHYLENE GLYCOL (Miralax*) 17 GM PACKET: 17 GM PO QDAY #30      * HYDROcodone-Acetaminophen 5-325 Mg 1 EACH TABLET: 1 TAB PO Q4H PRN       BREAKTHROUGH PAIN #90      * Morphine Sulfate ER 15 MG TABLET.ER: 15 MG PO Q12HR #60      Discontinued Medications      * HYDROcodone-Acetaminophen 5-325 Mg 1 EACH TABLET: 2 TAB PO Q4H PRN PAIN #20      New Diagnostics      * MRI ABDwwo MRCPwo, As Soon As Possible         Dx: Rectal cancer - C20      * Pelvis W/ Cont MRI, As Soon As Possible         Dx: Rectal cancer - C20      New Referrals      * Radiation Therapy, As Soon As Possible         PA COUCH with RADIATION ONCOLOGY         Dx: Rectal cancer - C20            Plan      Rectal adenocarcinoma: The patient has extensive local disease involving     inguinal lymph nodes and a primary mass  which extends into the vaginal wall and     protrudes through to the skin.  She has small noncalcified pulmonary nodules     which are too small to characterize.  She also has lesions in her liver which     are unable to be characterized by the CT scan which is been done.  With     intrahepatic and extrahepatic biliary duct dilation I will order an MRCP as well    as an abdominal/pelvic MRI to better characterize the malignancy.  I have     discussed her case extensively with Dr. Granados and Dr. Garay.  I will send    a formal consultation for Dr. Garay.  We will initially plan neoadjuvant chemor    adiation.  I discussed treatment options with the patient including capecitabine    and 5-FU.  The patient elected to take the oral medication.  I recommend that     she have a port placed since she will most certainly need IV chemotherapy at     some point during her treatment course.  This can be done during ostomy     placement which I recommend for symptomatic relief.  I explained to the patient     that her pain is likely to get worse before it gets better.  For this reason the    patient was very much agreeable to the ostomy.  She will talk to Dr. Granados    about this tomorrow during her follow-up appointment.  I will follow-up with the    patient after her MRI and MRCP.            Rectal pain: Secondary to underlying malignancy.  I will refill the patient's     hydrocodone 5-325mg which she takes every 4 hours.  I am also starting her on MS    Contin 15 mg twice daily.  Is likely she will need an increase in pain medica    tion around the start of therapy.            Patient Education      Patient Education Provided:  Yes            Time Spent Counseling Patient      Total Visit Time:  60      Over 50% Time Counseling Pt:  Yes            Electronically signed by FADIA CENTENO  07/21/2020 17:35       Disclaimer: Converted document may not contain table formatting or lab diagrams. Please see [facility] for the authenticated document.

## 2021-05-28 NOTE — PROGRESS NOTES
Patient: ROBERTO CARLOS MCCLURE     Acct: [id number]     Report: #[id number]  UNIT #: [id number]     : 1955    Encounter Date:2020  PRIMARY CARE:   ***Signed***  --------------------------------------------------------------------------------------------------------------------  NURSE INTAKE      Visit Type      Established Patient Visit            Chief Complaint      RECTAL CANCER            History and Present Illness      Past Oncology Illness History      Ms. Mcclure comes in today with her  to discuss her new diagnosis of Rectal    cancer.  She was referred to me by Dr. Silvia Granados.             CT of the abdomen/pelvis on 7/10/2020 showed 1) diffuse wall thickening in the     distal rectum.  Heterogeneous enhancement of the perineum.  2) inguinal     adenopathy measuring up to 2.5 cm.  3) 3 mm noncalcified right lower lobe nodul    e.  4) wall thickening in the fundus of the gallbladder with faint     calcification.  Focal adenomatosis is favored over neoplasm.  5) intrahepatic     and extrahepatic biliary duct dilation.            Biopsy of anal mass on 2020.  Pathology was positive for adenocarcinoma,     poorly differentiated.            CT of the chest on 2020 showed a 3 mm nodule in the right lower lobe and a     3 mm nodule in the left upper lobe.            Patient was taken to the operating room for port placement and a diverting     colostomy on 2020.  I was called on 2020 when the patient had MRI of     the abdomen and MRCP and inform that she had evidence of a small bowel     obstruction.  I contacted the patient and sent her to the hospital for     evaluation and treatment.  She was admitted from 2020 to 2020 and had     to return to the operating room for lysis of adhesions.            20:        MRI abdomen:      1. Fluid distended esophagus, stomach, and small bowel highly suspicious for     high-grade small bowel obstruction.  A transition  point is not clearly     identified in part due to MR technique and field of view.  CT may be helpful for    further evaluation of the exact location of a suspected transition point.      2. Abnormal predominantly extrahepatic biliary ductal dilatation.  No definite     choledocholithiasis is present however it would be difficult to fully exclude     due to the quality of the exam.  Ampullary stenosis or stricture could be     present.  If patient has abnormal liver function tests or an elevated bilirubin,    further evaluation with ERCP could be considered for definitive evaluation.      3. Cholelithiasis.              MRI pelvis:       1. Locally advanced low rectal cancer with significant involvement of the anal     sphincteric complex and localized invasion of the vagina.  Findings are     compatible with T4b tumor.      2. Bulbous appearance of the urethra raises question of possible urethral     involvement with tumor.  Correlate clinically for symptoms of urinary     retention/obstruction.      3. Bilateral inguinal adenopathy compatible with ryne involvement.      4. Nonspecific pelvic subcutaneous edema and vulvar edema.             CT abdomen/pelvis on 8/7/2020:      CONCLUSION:         1. High-grade small bowel obstruction with a discrete transition zone within the    region of the mid jejunum in the left lower quadrant of the abdomen.      2. Intrahepatic and extrahepatic biliary ductal dilatation without a discrete     mass or stone identified.      3. Ulcerating mass involving the rectum, perineum, and vagina with metastatic     bilateral inguinal adenopathy            Cycle 1 of FOLFOX on 8/31/2020      Cycle 2 of FOLFOX on 9/14/2020            Cycle 3: 9/28/20; acute pain but patient preference to continue treatment today            HPI - Oncology Interim      Ms. Mcclure comes in for f/u regarding several chronic issues:            1) Rectal Cancer: Ms. Mcclure comes in for follow-up.  In the interim,  she reports    that she is tolerating treatment adequately.  However, this morning, she reports    severe pain.  She describes as a sharp pain located in the rectal area.  It is     10 out of 10 in severity.  Unfortunately, she was unable to take her     breakthrough medication to address this.  She reports intermittent episodes of     this that are resolved with her breakthrough pain medication.  No other     modifying factors or associated signs or symptoms.  Otherwise, she and her      report that she has been tolerating treatment well and overall feels     that she is responding and improving on therapy.  She reports that she is able     to sleep better at night.  She denies melena or hematochezia.  She denies fevers    or chills or sweats.            2) Anemia: Ms. Mcclure reports that she tolerated iron treatment adequately.  She     denies ongoing bleeding symptoms.            3) Leukocytosis: Ms. Mcclure denies fevers or chills or sweats.  She denies cough     or dysuria.            ECOG Performance Status      1            Most Recent Lab Findings      Laboratory Tests      9/14/20 08:58            9/28/20 09:00            Laboratory Tests            Test       9/14/20      08:58             Magnesium Level       2.17 mg/dL      (1.60-2.30)            PAST, FAMILY   Past Medical History      Past Medical History:  None      Hematology/Oncology (F):  None      Other Hematology History:        Rectal cancer.      Genetic/Metabolic:  None            Past Surgical History      Appendectomy, Biopsy, Hysterectomy            Family History      Family History:  Lung Cancer            Social History      Lives independently:  Yes      Number of Children:  2      Occupation:  RETIRED            Tobacco Use      Tobacco status:  Never smoker            Substance Use      Substance use:  Denies use            REVIEW OF SYSTEMS      General:  Admits: Fatigue;          Denies: Appetite Change, Fever, Night Sweats,  Weight Gain, Weight Loss      Eye:  Denies Blurred Vision, Denies Corrective Lenses, Denies Diplopia, Denies     Vision Changes      ENT:  Denies Headache, Denies Hearing Loss, Denies Hoarseness, Denies Sore     Throat      Cardiovascular:  Denies Chest Pain, Denies Palpitations      Respiratory:  Denies: Cough, Coughing Blood, Productive Cough, Shortness of Air,    Wheezing      Gastrointestinal:  Denies Bloody Stools, Denies Constipation, Denies Diarrhea,     Denies Nausea/Vomiting, Denies Problem Swallowing, Denies Unable to Control     Bowels      Genitourinary:  Denies Blood in Urine, Denies Incontinence, Denies Painful     Urination      Musculoskeletal:  Denies Back Pain, Denies Muscle Pain, Denies Painful Joints      Integumentary:  Denies Itching, Denies Lesions, Denies Rash      Neurologic:  Denies Dizziness, Denies Numbness\Tingling, Denies Seizures      Psychiatric:  Denies Anxiety, Denies Depression      Endocrine:  Denies Cold Intolerance, Denies Heat Intolerance      Hematologic/Lymphatic:  Denies Bruising, Denies Bleeding, Denies Enlarged Lymph     Nodes      Reproductive:  Denies: Menopause, Heavy Periods, Pregnant, Still Menstruating            VITAL SIGNS AND SCORES      Vitals      Height 5 ft 5.24 in / 165.7 cm      Weight 113 lbs 8.590 oz / 51.5 kg      BSA 1.56 m2      BMI 18.8 kg/m2      Temperature 98.7 F / 37.06 C - Temporal      Pulse 87      Respirations 20      Blood Pressure 113/63 Sitting      Pulse Oximetry 100%, ROOM AIR            Pain Score      Experiencing any pain?:  Yes      Pain Scale Used:  Numerical      Pain Intensity:  10            Fatigue Score      Experiencing any fatigue?:  Yes      Fatigue (0-10 scale):  6            EXAM      General Appearance:  Positive for: Alert, Oriented x3, Mild Distress      Eye:  Positive for: Anicteric Sclerae, PERRLA      HEENT:  Negative for: Scleral Icterus, Thrush      Neck:  Positive for: Supple      Respiratory:  Negative for: Rales,  Rhochi      Abdomen/Gastro:  Positive for: Soft      Cardiovascular:  Positive for: RRR      Skin:  Negative for: Induration, Lesions      Psychiatric:  Positive for: AAO X 3      Lower Extremities:  Positive for: Edema            PREVENTION      Influenza Vaccine Declined:  Yes      2 or More Falls in Past Year?:  No      Fall Past Year with Injury?:  No      Encouraged to follow-up with:  PCP regarding preventative exams.      Chart initiated by      DAINA LANGFORD            ALLERGY/MEDS      Allergies      Coded Allergies:             NO KNOWN ALLERGIES (Verified , 9/28/20)            Medications      Last Reconciled on 9/16/20 23:21 by FADIA CENTENO      Polyethylene Glycol (Miralax*) 17 Gm Packet      17 GM PO QDAY, #30 PKT 0 Refills         Prov: FADIA CENTENO         9/14/20       Morphine Sulfate ER (Morphine Sulfate ER) 15 Mg Tablet.er      15 MG PO Q12HR, #60 TAB         Prov: FADIA CENTENO         8/31/20       Loperamide (Loperamide) 2 Mg Capsule      2 MG PO ASDIR, #60 TAB 5 Refills         Prov: FADIA CENTENO         8/25/20       Ondansetron Hcl (ONDANSETRON HCL) 8 Mg Tablet      8 MG PO Q8H PRN for NAUSEA, #30 TAB 3 Refills         Prov: FADIA CENTENO         8/20/20       Prochlorperazine (Compazine) 5 Mg Tab      10 MG PO TID for NAUSEA, #30 TAB 3 Refills         Prov: FADIA CENTENO         8/20/20       Zinc Oxide 20% Ointment (Zinc Oxide 20% Oint*) 28.35 Gm Oint...g.      1 APL TOPICAL TID for 30 Days, #1 TUBE         Prov: Billy Murray         8/18/20       Docusate Sodium (Docusate Sodium) 100 Mg Capsule      100 MG PO QDAY, CAP         Reported         8/7/20       HYDROcodone-Acetaminophen 5-325 Mg (HYDROcodone-Acetaminophen 5-325 Mg) 1 Each     Tablet      1-2 TAB PO Q4H PRN for PAIN, #36 TAB         Prov: CHRIS DANIELS         7/31/20      Medications Reviewed:  No Changes made to meds            IMPRESSION/PLAN      Impression      1) Rectal Cancer: Ms. Mcclure is a 65-year-old  female with a history of rectal     cancer.  She presents for her third cycle of FOLFOX today.  Unfortunately, she     presents with severe pain.  I explained her that currently I do not have an     explanation for her pain, and it is otherwise assumed to be related to her     malignancy.  We discussed the options of holding treatment today and proceeding     with emergency room evaluation, delaying treatment for 1 day and allowing for     adequate management of her pain, or proceeding with treatment.  Her blood work     and clinical findings do not have any findings otherwise prohibitive to t    reatment today.  Her preference is to continue with treatment today.  I     explained to her the risks of this including worsening pain and severe     complications as a consequence of not having a specific etiology for pain today.     She indicates understanding and her preference is to proceed with treatment     understanding these risks.  She was advised to immediately go the emergency room    if her symptoms do not improve.  We will also give pain medication in our clinic    to improve her symptoms.  If her symptoms remain persistent despite this, we may    elect to forego treatment today.  She will otherwise follow-up in 2 weeks with     her primary oncologist.  Further treatment decisions will be contingent upon her    tolerance of treatment at that time.  She indicates understanding and is     amenable to this plan.            2) Anemia: This is improved.  She will continue with surveillance for this and     intermittent iron as necessary.            3) Leukocytosis: This is improved.  She will follow this while on treatment.            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, T45.1X5A            Leukocytosis         Leukocytosis, unspecified type - D72.829         Leukocytosis type: unspecified            Plan      1) Proceed with FOLFOX today per patient preference             2) RTC 2 weeks for next treatment; cbc/cmp/mag prior            3) Patient to proceed immediately to ED for new or worsening pain symptoms            4) Nutrition to continue to work with patient regarding low albumin            Patient Education      Patient Education Provided:  Yes            Electronically signed by BETHANY CURTIS  09/28/2020 10:12       Disclaimer: Converted document may not contain table formatting or lab diagrams. Please see [employer] [facility] for the authenticated document.

## 2021-05-28 NOTE — PROGRESS NOTES
Patient: ROBERTO CARLOS LANE     Acct: [id number]     Report: #[id number]  UNIT #: [id number]     : 1955    Encounter Date:2021  PRIMARY CARE:   ***Signed***  --------------------------------------------------------------------------------------------------------------------  NURSE INTAKE      Visit Type      Established Patient Visit            Chief Complaint      RECTAL CANCER            History and Present Illness      Past Oncology Illness History      Locally Advanced Rectal cancer:             - Referred to me by Dr. Silvia Granados.       - CT of the abdomen/pelvis on 7/10/2020 showed 1) diffuse wall thickening in the    distal rectum.  Heterogeneous enhancement of the perineum.  2) inguinal     adenopathy measuring up to 2.5 cm.  3) 3 mm noncalcified right lower lobe     nodule.  4) wall thickening in the fundus of the gallbladder with faint     calcification.  Focal adenomatosis is favored over neoplasm.  5) intrahepatic     and extrahepatic biliary duct dilation.      - Biopsy of anal mass on 2020.  Pathology was positive for adenocarcinoma,     poorly differentiated.      - CT of the chest on 2020 showed a 3 mm nodule in the right lower lobe and     a 3 mm nodule in the left upper lobe.      - diverting colostomy on 2020 complicated by small bowel obstruction due to    adhesions, hospitalized from 2020 to 2020.      - MRI pelvis 2020: 1. Locally advanced low rectal cancer with significant     involvement of the anal sphincteric complex and localized invasion of the     vagina.  Findings are compatible with T4b tumor. 2. Bulbous appearance of the     urethra raises question of possible urethral involvement with tumor. 3.     Bilateral inguinal adenopathy compatible with ryne involvement. 4. Nonspecific     pelvic subcutaneous edema and vulvar edema.             Cycle 1-5 of FOLFOX from 2020 to 10/26/2020             CT CAP on 2020: New 6 mm  noncalcified nodule in the right lower lobe     suspicious for metastatic disease.  Mild splenomegaly.  Moderate intra and     extrahepatic bile duct dilation unchanged.  Ill-defined mass in the rectum and     perineum is smaller.  This mass previously measured 5.6 cm x 7.5 cm and now     measures 5.2 cm x 4 cm, indicating it has shrunk by at least half.            Cycle 6: 11/9/2020             Chemotherapy held on 11/23/2020 due to elevated AST/ALT and worsening pain.  She    is having worsening progression of local disease despite CT scan showing     improvement in the size of the primary mass.             Cycle 7: 12/7/20 (10% reduction in 5FU due to prior transaminitis)             Started 5-FU with concurrent radiation to the rectum and anal canal on     12/21/2020.  Second week of 5-FU/leucovorin on 1/18/2021            Cycle 1 FOLFIRI on 3/9/2021            HPI - Oncology Interim      Patient comes in today for her first cycle of FOLFIRI.  She is still in     significant pain.  I spoke with Dr. Garay after her radiation therapy and he is    concerned that she has dermal metastases on her right leg.  She shows me the     area of concern and says it is very painful.  At times the skin is oozing.  She     has significant edema of her lower extremities all the way up to her waist.  Her    rectal pain has improved slightly but she is completely miserable from the edema    in her legs.  We agreed to increase her gabapentin for more pain control.            Clinical Staging      At least cT4bN2 disease, stage IIIc.      Pulmonary nodules too small to characterize.            ECOG Performance Status      2            Most Recent Lab Findings      Laboratory Tests      3/9/21 10:07            PAST, FAMILY   Past Medical History      Past Medical History:  None      Hematology/Oncology (F):  None      Other Hematology History:        Rectal cancer.      Genetic/Metabolic:  None            Past Surgical History       Appendectomy, Biopsy, Hysterectomy            Diverting ostomy            Family History      Family History:  Lung Cancer            Social History      Lives independently:  Yes      Number of Children:  2      Occupation:  RETIRED            Tobacco Use      Tobacco status:  Never smoker            Substance Use      Substance use:  Denies use            REVIEW OF SYSTEMS      General:  Admits: Fatigue, Weight Gain;          Denies: Appetite Change, Fever, Night Sweats      Eye:  Denies Blurred Vision, Denies Corrective Lenses, Denies Diplopia, Denies     Vision Changes      ENT:  Denies Headache, Denies Hearing Loss, Denies Hoarseness, Denies Sore     Throat      Cardiovascular:  Denies Chest Pain, Denies Palpitations      Respiratory:  Denies: Cough, Coughing Blood, Productive Cough, Shortness of Air,    Wheezing      Gastrointestinal:  Denies Bloody Stools, Denies Constipation, Denies Diarrhea,     Denies Nausea/Vomiting, Denies Problem Swallowing, Denies Unable to Control     Bowels      Genitourinary:  Denies Blood in Urine, Denies Incontinence, Denies Painful     Urination      Musculoskeletal:  Denies Back Pain, Denies Muscle Pain, Denies Painful Joints      Integumentary:  Denies Itching; Admits Lesions, Admits Rash      Neurologic:  Denies Dizziness, Denies Numbness\Tingling, Denies Seizures      Psychiatric:  Denies Anxiety, Denies Depression      Endocrine:  Denies Cold Intolerance, Denies Heat Intolerance      Hematologic/Lymphatic:  Denies Bruising, Denies Bleeding; Admits Enlarged Lymph     Nodes      Other      severe swelling of the lower extremity      Reproductive:  Denies: Menopause, Heavy Periods, Pregnant, Still Menstruating            VITAL SIGNS AND SCORES      Vitals      Weight 138 lbs 14.237 oz / 63.0 kg      Temperature 98.6 F / 37 C - Temporal      Pulse 87      Respirations 18      Blood Pressure 121/49 Sitting, Right Arm      Pulse Oximetry 96%, ROOM AIR            Pain Score       Experiencing any pain?:  Yes      Pain Scale Used:  Numerical      Pain Intensity:  7            Fatigue Score      Experiencing any fatigue?:  Yes      Fatigue (0-10 scale):  7            EXAM      General: Alert, cooperative, but chronically ill appearing and cachectic      Eyes: Anicteric sclera, PERRLA      Respiratory: CTAB, normal respiratory effort      Abdomen: Normal active bowel sounds, no tenderness, no distention      Cardiovascular: RRR, no murmur, 3+ lower extremity edema      Skin: Papular skin changes on the right inner thigh approximately the size of     her hand, concerning for dermal metastases, no open lesions      Psychiatric: Appropriate affect, intact judgment      Neurologic: No focal sensory or motor deficits, no weakness, numbness, dizziness      Musculoskeletal: Normal muscle strength and tone      Extremities: No clubbing, cyanosis, or deformities            PREVENTION      Hx Influenza Vaccination:  No      Influenza Vaccine Declined:  Yes      Hx Pneumococcal Vaccination:  No      Encouraged to follow-up with:  PCP regarding preventative exams.      Chart initiated by      DAINA LANGFORD            ALLERGY/MEDS      Allergies      Coded Allergies:             NO KNOWN ALLERGIES (Verified , 3/9/21)            Medications      Last Reconciled on 3/18/21 15:48 by FADIA CENTENO      Gabapentin (Gabapentin) 300 Mg Capsule      300 MG PO TID for 30 Days, #90 CAP 0 Refills         Prov: ELIU FRIAS         2/16/21       Morphine Sulfate ER (Morphine Sulfate ER) 100 Mg Tablet.er      100 MG PO Q12HR, #60 TAB.SR         Prov: PA COUCH         2/16/21       oxyCODONE IR (oxyCODONE IR) 5 Mg Tablet      10 MG PO Q3H PRN for PAIN for 20 Days, #60 TAB 0 Refills         Prov: PA COUCH         2/16/21       Morphine Sulfate ER (Morphine Sulfate ER) 100 Mg Tablet.er      100 MG PO Q12HR, #60 TAB.SR         Prov: PA COUCH         1/26/21       Gabapentin (Gabapentin) 300  Mg Capsule      300 MG PO TID, #90 CAP 5 Refills         Prov: FADIA CENTENO         1/19/21       Morphine Sulfate ER (Morphine Sulfate ER) 60 Mg Tablet.er      60 MG PO Q12HR, #60 TAB.SR         Prov: PA COUCH         1/5/21       oxyCODONE IR (oxyCODONE IR) 10 Mg Tablet      10 MG PO Q3H PRN for PAIN, #120 TAB         Prov: FADIA CENTENO         12/21/20       Mirtazapine (Mirtazapine*) 15 Mg Tablet      15 MG PO HS for 30 Days, #30 TAB 5 Refills         Prov: FADIA CENTENO         11/24/20       Polyethylene Glycol (Miralax*) 17 Gm Packet      17 GM PO QDAY, #30 PKT 0 Refills         Prov: FADIA CENTENO         9/14/20       Loperamide (Loperamide) 2 Mg Capsule      2 MG PO ASDIR, #60 TAB 5 Refills         Prov: FADIA CENTENO         8/25/20       Ondansetron Hcl (ONDANSETRON HCL) 8 Mg Tablet      8 MG PO Q8H PRN for NAUSEA, #30 TAB 3 Refills         Prov: FADIA CENTENO         8/20/20       Prochlorperazine (Compazine) 5 Mg Tab      10 MG PO TID for NAUSEA, #30 TAB 3 Refills         Prov: FADIA CENTENO         8/20/20       Docusate Sodium (Docusate Sodium) 100 Mg Capsule      100 MG PO QDAY, CAP         Reported         8/7/20      Medications Reviewed:  No Changes made to meds            IMPRESSION/PLAN      Diagnosis      Rectal cancer - C20            Lymphedema of right lower extremity - I89.0            Notes      New Referrals      * Occupational Therapy, Routine         [facility]         Dx: Rectal cancer - C20      * Paliative/Supportive Care, Routine         [facility]         Reason for Referral: evaluate for pain control/ symptom management         Dx: Rectal cancer - C20            Plan      Plan Of Care:             * Rectal cancer -patient has extensive local disease and was initially treated       with 6 cycles of FOLFOX.  The primary tumor location did respond to some       degree but she had some progression in the labial mass. She then underwent        concurent chemoRT with 5FU/Lv.  Unfortunately her cancer has progressed       further and she appears to have dermal metastatsis. She is here today for C1       FOLFIRI.  Follow-up prior to C2 in 2 weeks.             * Dermal Metastasis: right thigh. Will refer to palliative care for recommen      dations on symptom control.             * Lower Extremity Edema: Likely from disease progression with ryne metastasis       in the pelvis.  Will refer to OT for symptomatic care.             * Nutrition: Patient feels that her weight is stable and she is currently       mirtazapine.            * Rectal pain: Patient symptoms are tolerable on 30 mg MS Contin at Noon and       Midnight.  She also takes twice a day oxycodone 10 mg. Will increase her       gabapentin from 300 mg 3 times daily to 600mg TID if tolerated.            Patient Education      Patient Education Provided:  Yes            Electronically signed by FADIA CENTENO  03/18/2021 15:49       Disclaimer: Converted document may not contain table formatting or lab diagrams. Please see [employer] [facility] for the authenticated document.

## 2021-05-28 NOTE — PROGRESS NOTES
Patient: ROBERTO CARLOS LANE     Acct: [id number]     Report: #[id number]  UNIT #: [id number]     : 1955    Encounter Date:2021  PRIMARY CARE:   ***Signed***  --------------------------------------------------------------------------------------------------------------------  NURSE INTAKE      Visit Type      Established Patient Visit            Chief Complaint      RECTAL CA            History and Present Illness      Past Oncology Illness History      Locally Advanced Rectal cancer:             - Referred to me by Dr. Silvia Granados.       - CT of the abdomen/pelvis on 7/10/2020 showed 1) diffuse wall thickening in the    distal rectum.  Heterogeneous enhancement of the perineum.  2) inguinal     adenopathy measuring up to 2.5 cm.  3) 3 mm noncalcified right lower lobe     nodule.  4) wall thickening in the fundus of the gallbladder with faint     calcification.  Focal adenomatosis is favored over neoplasm.  5) intrahepatic     and extrahepatic biliary duct dilation.      - Biopsy of anal mass on 2020.  Pathology was positive for adenocarcinoma,     poorly differentiated.      - CT of the chest on 2020 showed a 3 mm nodule in the right lower lobe and     a 3 mm nodule in the left upper lobe.      - diverting colostomy on 2020 complicated by small bowel obstruction due to    adhesions, hospitalized from 2020 to 2020.      - MRI pelvis 2020: 1. Locally advanced low rectal cancer with significant     involvement of the anal sphincteric complex and localized invasion of the     vagina.  Findings are compatible with T4b tumor. 2. Bulbous appearance of the     urethra raises question of possible urethral involvement with tumor. 3. Davis    ateral inguinal adenopathy compatible with ryne involvement. 4. Nonspecific     pelvic subcutaneous edema and vulvar edema.             Cycle 1-5 of FOLFOX from 2020 to 10/26/2020             CT CAP on 2020: New 6 mm noncalcified  "nodule in the right lower lobe     suspicious for metastatic disease.  Mild splenomegaly.  Moderate intra and     extrahepatic bile duct dilation unchanged.  Ill-defined mass in the rectum and     perineum is smaller.  This mass previously measured 5.6 cm x 7.5 cm and now     measures 5.2 cm x 4 cm, indicating it has shrunk by at least half.            Cycle 6: 11/9/2020             Chemotherapy held on 11/23/2020 due to elevated AST/ALT and worsening pain.  She    is having worsening progression of local disease despite CT scan showing     improvement in the size of the primary mass.             Cycle 7: 12/7/20 (10% reduction in 5FU due to prior transaminitis)             Started 5-FU with concurrent radiation to the rectum and anal canal on     12/21/2020.  Second week of 5-FU/leucovorin on 1/18/2021            HPI - Oncology Interim      Patient comes in today for her next week of 5-FU leucovorin with concurrent     radiation.  She says that she has gained more weight.  She is very happy that     her weight is up to 122.3.  She was previously at 119.9 pounds.  She does     continue to have the same degree of lower extremity edema has the last time we     met.            She does still have significant perineal pain.  She is worried about \"spots on     the right side of her labia.  She believes Dr. Garay is radiating the left side    of her labia but it appears there are nodules on the right side that are     growing.            Clinical Staging      At least cT4bN2 disease, stage IIIc.      Pulmonary nodules too small to characterize.            ECOG Performance Status      2            Most Recent Lab Findings      Laboratory Tests      1/15/21 10:42            1/18/21 10:37            PAST, FAMILY   Past Medical History      Past Medical History:  None      Hematology/Oncology (F):  None      Other Hematology History:        Rectal cancer.      Genetic/Metabolic:  None            Past Surgical History    "   Appendectomy, Biopsy, Hysterectomy            Family History      Family History:  Lung Cancer            Social History      Lives independently:  Yes      Number of Children:  2      Occupation:  RETIRED            Tobacco Use      Tobacco status:  Never smoker            Substance Use      Substance use:  Denies use            REVIEW OF SYSTEMS      General:  Admits: Fatigue;          Denies: Appetite Change, Fever, Night Sweats, Weight Gain, Weight Loss      Eye:  Admits Corrective Lenses; Denies Blurred Vision, Denies Diplopia, Denies     Vision Changes      ENT:  Denies Headache, Denies Hearing Loss, Denies Hoarseness, Denies Sore     Throat      Cardiovascular:  Denies Chest Pain, Denies Palpitations      Respiratory:  Denies: Cough, Coughing Blood, Productive Cough, Shortness of Air,    Wheezing      Gastrointestinal:  Denies Bloody Stools, Denies Constipation, Denies Diarrhea,     Denies Nausea/Vomiting, Denies Problem Swallowing, Denies Unable to Control     Bowels      Genitourinary:  Denies Blood in Urine, Denies Incontinence, Denies Painful     Urination      Musculoskeletal:  Denies Back Pain; Admits Muscle Pain; Denies Painful Joints      Other      RECTAL PAIN      Integumentary:  Denies Itching, Denies Lesions, Denies Rash      Neurologic:  Denies Dizziness, Denies Numbness\Tingling, Denies Seizures      Psychiatric:  Denies Anxiety, Denies Depression      Endocrine:  Denies Cold Intolerance, Denies Heat Intolerance      Hematologic/Lymphatic:  Denies Bruising, Denies Bleeding, Denies Enlarged Lymph     Nodes      Reproductive:  Denies: Menopause, Heavy Periods, Pregnant, Still Menstruating            VITAL SIGNS AND SCORES      Vitals      Weight 122 lbs 5.685 oz / 55.5 kg      Temperature 99.1 F / 37.28 C - Temporal      Pulse 85      Respirations 18      Blood Pressure 108/45 Sitting      Pulse Oximetry 99%, RM AIR            Pain Score      Experiencing any pain?:  Yes      Pain Scale Used:   Numerical      Pain Intensity:  6            Fatigue Score      Experiencing any fatigue?:  Yes      Fatigue (0-10 scale):  7            EXAM      General: Alert, cooperative, no acute distress, very thin but improved weight      Eyes: Anicteric sclera, PERRLA      Respiratory: CTAB, normal respiratory effort      Abdomen: Normal active bowel sounds, no tenderness, no distention      Cardiovascular: RRR, no murmur, no lower extremity edema      Skin: Normal tone, no rash.  Small nodules in the right perineum are consistent     with local metastasis.  No open lesions.      Psychiatric: Appropriate affect, intact judgment      Neurologic: No focal sensory or motor deficits, no weakness, numbness, dizziness      Musculoskeletal: Normal muscle strength and tone      Extremities: No clubbing, cyanosis, or deformities            PREVENTION      Hx Influenza Vaccination:  No      Influenza Vaccine Declined:  Yes      2 or More Falls in Past Year?:  No      Fall Past Year with Injury?:  No      Hx Pneumococcal Vaccination:  No      Encouraged to follow-up with:  PCP regarding preventative exams.      Chart initiated by      JO-ANN BROWN MA            ALLERGY/MEDS      Allergies      Coded Allergies:             NO KNOWN ALLERGIES (Verified , 1/18/21)            Medications      Last Reconciled on 2/15/21 22:31 by FADIA CENTENO      Morphine Sulfate ER (Morphine Sulfate ER) 100 Mg Tablet.er      100 MG PO Q12HR, #60 TAB.SR         Prov: PA COUCH         1/26/21       Gabapentin (Gabapentin) 300 Mg Capsule      300 MG PO TID, #90 CAP 5 Refills         Prov: FADIA CENTENO         1/19/21       Morphine Sulfate ER (Morphine Sulfate ER) 60 Mg Tablet.er      60 MG PO Q12HR, #60 TAB.SR         Prov: PA COUCH         1/5/21       oxyCODONE IR (oxyCODONE IR) 10 Mg Tablet      10 MG PO Q3H PRN for PAIN, #120 TAB         Prov: FADIA CENTENO         12/21/20       Mirtazapine (Mirtazapine*) 15 Mg Tablet      15 MG PO HS for  30 Days, #30 TAB 5 Refills         Prov: FADIA CENTENO         11/24/20       Polyethylene Glycol (Miralax*) 17 Gm Packet      17 GM PO QDAY, #30 PKT 0 Refills         Prov: FADIA CENTENO         9/14/20       Loperamide (Loperamide) 2 Mg Capsule      2 MG PO ASDIR, #60 TAB 5 Refills         Prov: FADIA CENTENO         8/25/20       Ondansetron Hcl (ONDANSETRON HCL) 8 Mg Tablet      8 MG PO Q8H PRN for NAUSEA, #30 TAB 3 Refills         Prov: FADIA CENTENO         8/20/20       Prochlorperazine (Compazine) 5 Mg Tab      10 MG PO TID for NAUSEA, #30 TAB 3 Refills         Prov: FADIA CENTENO         8/20/20       Docusate Sodium (Docusate Sodium) 100 Mg Capsule      100 MG PO QDAY, CAP         Reported         8/7/20      Medications Reviewed:  No Changes made to meds            IMPRESSION/PLAN      Diagnosis      Rectal cancer - C20            Notes      New Medications      * Gabapentin 300 MG CAPSULE: 300 MG PO TID #90            Plan      * Rectal cancer -patient has extensive local disease and was initially treated       with 6 cycles of FOLFOX.  The primary tumor location did respond to some       degree but she had some progression in the labial mass.  She is currently       undergoing concurrent chemoradiation.  She is here today for her last week of       radiation therapy and will get 5 days of 5-FU and leucovorin pushes.  We will       plan to start FOLFIRI chemotherapy in about 4 weeks after the patient has       completed radiation.  I did review the risks and benefits of those medications      again with the patient.  We will also plan for repeat CT CAP in February.      * Nutrition: Weight is improving.  She is currently on mirtazapine.      * Rectal pain: Patient symptoms are well enough controlled on 30 mg MS Contin       twice a day with oxycodone 10 mg every 3 hours as needed and gabapentin 300 mg      3 times daily.  We will refill the gabapentin today.  Her other medications       were  refilled recently.            Patient Education      Patient Education Provided:  Yes            Electronically signed by FADIA CENTENO  02/15/2021 22:31       Disclaimer: Converted document may not contain table formatting or lab diagrams. Please see [facility] for the authenticated document.

## 2021-05-28 NOTE — PROGRESS NOTES
Patient: ROBERTO CARLOS LANE     Acct: [id number]     Report: #[id number]  UNIT #: [id number]     : 1955    Encounter Date:2021  PRIMARY CARE:   ***Signed***  --------------------------------------------------------------------------------------------------------------------  NURSE INTAKE      Visit Type      Established Patient Visit            Chief Complaint      RECTAL CA            History and Present Illness      Past Oncology Illness History      Locally Advanced Rectal cancer:             - Referred to me by Dr. Silvia Granados.       - CT of the abdomen/pelvis on 7/10/2020 showed 1) diffuse wall thickening in the    distal rectum.  Heterogeneous enhancement of the perineum.  2) inguinal     adenopathy measuring up to 2.5 cm.  3) 3 mm noncalcified right lower lobe     nodule.  4) wall thickening in the fundus of the gallbladder with faint     calcification.  Focal adenomatosis is favored over neoplasm.  5) intrahepatic     and extrahepatic biliary duct dilation.      - Biopsy of anal mass on 2020.  Pathology was positive for adenocarcinoma,     poorly differentiated.      - CT of the chest on 2020 showed a 3 mm nodule in the right lower lobe and     a 3 mm nodule in the left upper lobe.      - diverting colostomy on 2020 complicated by small bowel obstruction due to    adhesions, hospitalized from 2020 to 2020.      - MRI pelvis 2020: 1. Locally advanced low rectal cancer with significant     involvement of the anal sphincteric complex and localized invasion of the     vagina.  Findings are compatible with T4b tumor. 2. Bulbous appearance of the     urethra raises question of possible urethral involvement with tumor. 3. B    ilateral inguinal adenopathy compatible with ryne involvement. 4. Nonspecific     pelvic subcutaneous edema and vulvar edema.             Cycle 1-5 of FOLFOX from 2020 to 10/26/2020             CT CAP on 2020: New 6 mm noncalcified  nodule in the right lower lobe     suspicious for metastatic disease.  Mild splenomegaly.  Moderate intra and     extrahepatic bile duct dilation unchanged.  Ill-defined mass in the rectum and     perineum is smaller.  This mass previously measured 5.6 cm x 7.5 cm and now     measures 5.2 cm x 4 cm, indicating it has shrunk by at least half.            Cycle 6: 11/9/2020             Chemotherapy held on 11/23/2020 due to elevated AST/ALT and worsening pain.  She    is having worsening progression of local disease despite CT scan showing     improvement in the size of the primary mass.             Cycle 7: 12/7/20 (10% reduction in 5FU due to prior transaminitis)             Started 5-FU with concurrent radiation to the rectum and anal canal on     12/21/2020.  Second week of 5-FU/leucovorin on 1/18/2021            Cycle 1 FOLFIRI on 3/9/2021            HPI - Oncology Interim      Patient comes in today for neck cycle FOLFIRI.  She continues to have     significant pain in her right leg.  The pain is burning and quite severe.  She     has difficulty walking but also difficulty sleeping.  She is only able to wear     dresses so that very little material comes in contact with the leg.  I discussed    increasing her gabapentin which she is agreeable to.  She will also continue her    other medications.  Her labs remained stable with a hemoglobin 9.9.            There was some confusion regarding her appointment with occupational therapy and    palliative care.  After much discussion I believe we have this issue sorted out.     She will return to occupational therapy with her  so they can discuss     the treatment plan.            Clinical Staging      At least cT4bN2 disease, stage IIIc.      Pulmonary nodules too small to characterize.            ECOG Performance Status      2            Most Recent Lab Findings      Laboratory Tests      3/9/21 09:57            3/23/21 09:56            Laboratory Tests             Test       3/9/21      09:57             Magnesium Level       2.05 mg/dL      (1.60-2.30)            PAST, FAMILY   Past Medical History      Past Medical History:  None      Hematology/Oncology (F):  None      Other Hematology History:        Rectal cancer.      Genetic/Metabolic:  None            Past Surgical History      Appendectomy, Biopsy, Hysterectomy            Diverting ostomy            Family History      Family History:  Lung Cancer            Social History      Lives independently:  Yes      Number of Children:  2      Occupation:  RETIRED            Tobacco Use      Tobacco status:  Never smoker            Substance Use      Substance use:  Denies use            REVIEW OF SYSTEMS      General:  Admits: Fatigue;          Denies: Appetite Change, Fever, Night Sweats, Weight Gain, Weight Loss      Eye:  Denies Blurred Vision, Denies Corrective Lenses, Denies Diplopia, Denies     Vision Changes      ENT:  Denies Headache, Denies Hearing Loss, Denies Hoarseness, Denies Sore     Throat      Cardiovascular:  Denies Chest Pain, Denies Palpitations      Respiratory:  Denies: Cough, Coughing Blood, Productive Cough, Shortness of Air,    Wheezing      Gastrointestinal:  Denies Bloody Stools, Denies Constipation, Denies Diarrhea,     Denies Nausea/Vomiting, Denies Problem Swallowing, Denies Unable to Control     Bowels      Genitourinary:  Denies Blood in Urine, Denies Incontinence, Denies Painful     Urination      Musculoskeletal:  Denies Back Pain, Denies Muscle Pain; Admits Painful Joints      Integumentary:  Denies Itching; Admits Lesions, Admits Rash      Neurologic:  Denies Dizziness; Admits Numbness\Tingling (toes); Denies Seizures      Psychiatric:  Denies Anxiety, Denies Depression      Endocrine:  Denies Cold Intolerance, Denies Heat Intolerance      Hematologic/Lymphatic:  Denies Bruising, Denies Bleeding, Denies Enlarged Lymph     Nodes      Reproductive:  Denies: Menopause, Heavy Periods,  Pregnant, Still Menstruating            VITAL SIGNS AND SCORES      Vitals      Weight 142 lbs 10.202 oz / 64.7 kg      Temperature 98.2 F / 36.78 C - Temporal      Pulse 98      Respirations 16      Blood Pressure 128/50 Sitting      Pulse Oximetry 98%, RM AIR            Pain Score      Experiencing any pain?:  Yes      Pain Scale Used:  Numerical      Pain Intensity:  6            Fatigue Score      Experiencing any fatigue?:  Yes      Fatigue (0-10 scale):  6            EXAM      General: Alert, cooperative but very thin/emaciated and chronically ill-    appearing      Eyes: Anicteric sclera, PERRLA      Respiratory: CTAB, normal respiratory effort      Abdomen: Ostomy in place, moderate distention      Cardiovascular: RRR, no murmur, 3+ lower extremity edema      Skin: skin thickening and rash on the right thigh likely due to dermal     metastasis, unchanged      Psychiatric: Appropriate affect, intact judgment      Neurologic: No focal sensory or motor deficits, no weakness, numbness, dizziness      Musculoskeletal: Normal muscle strength and tone      Extremities: No clubbing, cyanosis, or deformities            PREVENTION      Hx Influenza Vaccination:  No      Influenza Vaccine Declined:  Yes      2 or More Falls in Past Year?:  No      Fall Past Year with Injury?:  No      Hx Pneumococcal Vaccination:  No      Encouraged to follow-up with:  PCP regarding preventative exams.      Chart initiated by      ROXANNE COREY CMA            ALLERGY/MEDS      Allergies      Coded Allergies:             NO KNOWN ALLERGIES (Verified , 3/23/21)            Medications      Last Reconciled on 3/18/21 15:48 by FADIA CENTENO      Gabapentin (Gabapentin) 300 Mg Capsule      300 MG PO TID for 30 Days, #90 CAP 0 Refills         Prov: ELIU FRIAS         2/16/21       Morphine Sulfate ER (Morphine Sulfate ER) 100 Mg Tablet.er      100 MG PO Q12HR, #60 TAB.SR         Prov: PA COUCH         2/16/21       oxyCODONE  IR (oxyCODONE IR) 5 Mg Tablet      10 MG PO Q3H PRN for PAIN for 20 Days, #60 TAB 0 Refills         Prov: PA COUCH         2/16/21       Morphine Sulfate ER (Morphine Sulfate ER) 100 Mg Tablet.er      100 MG PO Q12HR, #60 TAB.SR         Prov: PA COUCH         1/26/21       Gabapentin (Gabapentin) 300 Mg Capsule      300 MG PO TID, #90 CAP 5 Refills         Prov: JACOBOFADIA         1/19/21       Morphine Sulfate ER (Morphine Sulfate ER) 60 Mg Tablet.er      60 MG PO Q12HR, #60 TAB.SR         Prov: PA COUCH         1/5/21       oxyCODONE IR (oxyCODONE IR) 10 Mg Tablet      10 MG PO Q3H PRN for PAIN, #120 TAB         Prov: CETNENOFADIA CAN         12/21/20       Mirtazapine (Mirtazapine*) 15 Mg Tablet      15 MG PO HS for 30 Days, #30 TAB 5 Refills         Prov: FADIA CENTENO         11/24/20       Polyethylene Glycol (Miralax*) 17 Gm Packet      17 GM PO QDAY, #30 PKT 0 Refills         Prov: CENTENOFADIA CAN         9/14/20       Loperamide (Loperamide) 2 Mg Capsule      2 MG PO ASDIR, #60 TAB 5 Refills         Prov: CENTENOFADIA CAN         8/25/20       Ondansetron Hcl (ONDANSETRON HCL) 8 Mg Tablet      8 MG PO Q8H PRN for NAUSEA, #30 TAB 3 Refills         Prov: FADIA CENTENO         8/20/20       Prochlorperazine (Compazine) 5 Mg Tab      10 MG PO TID for NAUSEA, #30 TAB 3 Refills         Prov: FADIA CENTENO         8/20/20       Docusate Sodium (Docusate Sodium) 100 Mg Capsule      100 MG PO QDAY, CAP         Reported         8/7/20      Medications Reviewed:  No Changes made to meds            IMPRESSION/PLAN      Diagnosis      Notes      New Medications      * oxyCODONE IR 10 MG TABLET: 10 MG PO Q3H PRN PAIN #120      * Morphine Sulfate  MG TABLET.ER: 100 MG PO Q12HR #60      * Gabapentin 300 MG CAPSULE: 600 MG PO TID 30 Days #180            Plan      * Rectal cancer -patient has extensive local disease and was initially treated       with 6 cycles of FOLFOX.  The primary tumor location  did respond to some       degree but she had some progression in the labial mass. She then underwent       concurent chemoRT with 5FU/Lv.  Unfortunately her cancer has progressed       further and she appears to have dermal metastatsis. She is here today for C2       FOLFIRI.  Follow-up prior to C2 in 2 weeks.             * Dermal Metastasis: right thigh. She has not yet seen palliative care but has       an upcoming appt. I will increase her gabapentin to 600mg TID.             * Lower Extremity Edema: There was some confusion around her OT appt.  I       encouraged her to go back and continue the treatment plan.              * Nutrition: Patient feels that her weight is stable and she is currently       mirtazapine.            * Leg pain and Rectal pain: Patient symptoms are intolerable in her legs but       stable to improved in the rectum. She will continue 30 mg MS Contin at Noon       and Midnight with oxycodone 10 mg as needed. Will increase her gabapentin from      300 mg 3 times daily to 600mg TID if tolerated.            Patient Education      Patient Education Provided:  Yes            Electronically signed by FADIA CENTENO  05/14/2021 14:15       Disclaimer: Converted document may not contain table formatting or lab diagrams. Please see [email or web address] for the authenticated document.

## 2021-05-28 NOTE — PROGRESS NOTES
Patient: ROBERTO CARLOS MCCLURE     Acct: [id number]     Report: #[id number]  UNIT #: [id number]     : 1955    Encounter Date:10/26/2020  PRIMARY CARE:   ***Signed***  --------------------------------------------------------------------------------------------------------------------  NURSE INTAKE      Visit Type      Established Patient Visit            Chief Complaint      RECTAL CANCER            History and Present Illness      Past Oncology Illness History      Ms. Mcclure comes in today with her  to discuss her new diagnosis of Rectal    cancer.  She was referred to me by Dr. Silvia Granados.             CT of the abdomen/pelvis on 7/10/2020 showed 1) diffuse wall thickening in the     distal rectum.  Heterogeneous enhancement of the perineum.  2) inguinal     adenopathy measuring up to 2.5 cm.  3) 3 mm noncalcified right lower lobe     nodule.  4) wall thickening in the fundus of the gallbladder with faint     calcification.  Focal adenomatosis is favored over neoplasm.  5) intrahepatic     and extrahepatic biliary duct dilation.            Biopsy of anal mass on 2020.  Pathology was positive for adenocarcinoma,     poorly differentiated.            CT of the chest on 2020 showed a 3 mm nodule in the right lower lobe and a     3 mm nodule in the left upper lobe.            Patient was taken to the operating room for port placement and a diverting     colostomy on 2020.  I was called on 2020 when the patient had MRI of     the abdomen and MRCP and inform that she had evidence of a small bowel     obstruction.  I contacted the patient and sent her to the hospital for ev    aluation and treatment.  She was admitted from 2020 to 2020 and had to     return to the operating room for lysis of adhesions.            20:        MRI abdomen:      1. Fluid distended esophagus, stomach, and small bowel highly suspicious for     high-grade small bowel obstruction.  A transition  point is not clearly     identified in part due to MR technique and field of view.  CT may be helpful for    further evaluation of the exact location of a suspected transition point.      2. Abnormal predominantly extrahepatic biliary ductal dilatation.  No definite     choledocholithiasis is present however it would be difficult to fully exclude     due to the quality of the exam.  Ampullary stenosis or stricture could be     present.  If patient has abnormal liver function tests or an elevated bilirubin,    further evaluation with ERCP could be considered for definitive evaluation.      3. Cholelithiasis.              MRI pelvis:       1. Locally advanced low rectal cancer with significant involvement of the anal     sphincteric complex and localized invasion of the vagina.  Findings are     compatible with T4b tumor.      2. Bulbous appearance of the urethra raises question of possible urethral     involvement with tumor.  Correlate clinically for symptoms of urinary     retention/obstruction.      3. Bilateral inguinal adenopathy compatible with ryne involvement.      4. Nonspecific pelvic subcutaneous edema and vulvar edema.             CT abdomen/pelvis on 8/7/2020:      CONCLUSION:         1. High-grade small bowel obstruction with a discrete transition zone within the    region of the mid jejunum in the left lower quadrant of the abdomen.      2. Intrahepatic and extrahepatic biliary ductal dilatation without a discrete     mass or stone identified.      3. Ulcerating mass involving the rectum, perineum, and vagina with metastatic     bilateral inguinal adenopathy            Cycle 1 of FOLFOX on 8/31/2020      Cycle 2 of FOLFOX on 9/14/2020      Cycle 3: 9/28/20      cycle 4: 10/12/2020      Cycle 5: 10/26/2020 have to you related to your breath smells like            HPI - Oncology Interim      Patient comes in for her first cycle of chemotherapy.  She said that the pain is    worse in her rectum over the  past few days.  However she is managed to gain at     least 1 pound.  She is disappointed that her weight gain is not better since she    feels like she has been eating quite a bit.  She continues to have small amounts    of bleeding on a pad every day.  The bleeding is just spotting is not large in     volume.  She says her appetite is good and she has been trying to get high-    calorie snacks as well as 3 meals a day of some size.            ECOG Performance Status      2            Most Recent Lab Findings      Laboratory Tests      10/26/20 09:50            10/26/20 09:54            Laboratory Tests            Test       10/26/20      09:50             Magnesium Level       2.26 mg/dL      (1.60-2.30)            PAST, FAMILY   Past Medical History      Past Medical History:  None      Hematology/Oncology (F):  None      Other Hematology History:        Rectal cancer.      Genetic/Metabolic:  None            Past Surgical History      Appendectomy, Biopsy, Hysterectomy            Family History      Family History:  Lung Cancer            Social History      Lives independently:  Yes      Number of Children:  2      Occupation:  RETIRED            Tobacco Use      Tobacco status:  Never smoker            Substance Use      Substance use:  Denies use            REVIEW OF SYSTEMS      General:  Admits: Fatigue;          Denies: Appetite Change, Fever, Night Sweats, Weight Gain, Weight Loss      Eye:  Denies Blurred Vision, Denies Corrective Lenses, Denies Diplopia, Denies     Vision Changes      ENT:  Denies Headache, Denies Hearing Loss, Denies Hoarseness, Denies Sore     Throat      Cardiovascular:  Denies Chest Pain, Denies Palpitations      Respiratory:  Denies: Cough, Coughing Blood, Productive Cough, Shortness of Air,    Wheezing      Gastrointestinal:  Admits Bloody Stools; Denies Constipation, Denies Diarrhea,     Denies Nausea/Vomiting, Denies Problem Swallowing, Denies Unable to Control     Bowels       Other      Rectal pain      Genitourinary:  Denies Blood in Urine, Denies Incontinence, Denies Painful     Urination      Musculoskeletal:  Denies Back Pain, Denies Muscle Pain, Denies Painful Joints      Integumentary:  Denies Itching, Denies Lesions, Denies Rash      Neurologic:  Denies Dizziness, Denies Numbness\Tingling, Denies Seizures      Psychiatric:  Denies Anxiety, Denies Depression      Endocrine:  Denies Cold Intolerance, Denies Heat Intolerance      Hematologic/Lymphatic:  Denies Bruising, Denies Bleeding, Denies Enlarged Lymph     Nodes      Reproductive:  Denies: Menopause, Heavy Periods, Pregnant, Still Menstruating            VITAL SIGNS AND SCORES      Vitals      Weight 109 lbs 5.571 oz / 49.6 kg      Temperature 98.0 F / 36.67 C - Temporal      Pulse 71      Respirations 18      Blood Pressure 105/51 Sitting      Pulse Oximetry 100%, ROOM AIR            Pain Score      Experiencing any pain?:  Yes      Pain Scale Used:  Numerical      Pain Intensity:  9 (RECTAL PAIN)            Fatigue Score      Experiencing any fatigue?:  Yes      Fatigue (0-10 scale):  4            EXAM      General: Alert, cooperative, no acute distress, very thin and cachectic body     acute      Eyes: Anicteric sclera, PERRLA      Respiratory: CTAB, normal respiratory effort      Abdomen: Normal active bowel sounds, ostomy appears healthy, abdomen is somewhat    tense but improved from prior      Cardiovascular: RRR, no murmur, no peripheral edema      Skin: Normal tone, no rash, no lesions      Psychiatric: Appropriate affect, intact judgment      Neurologic: No focal sensory or motor deficits, no weakness, numbness, dizziness      Musculoskeletal: Normal muscle strength, normal muscle tone      Extremities: No clubbing, cyanosis, or deformities            PREVENTION      Hx Influenza Vaccination:  No      Influenza Vaccine Declined:  Yes      2 or More Falls in Past Year?:  No      Fall Past Year with Injury?:  No       Encouraged to follow-up with:  PCP regarding preventative exams.      Chart initiated by      DAINA LANGFORD            ALLERGY/MEDS      Allergies      Coded Allergies:             NO KNOWN ALLERGIES (Verified , 10/26/20)            Medications      Last Reconciled on 10/26/20 22:53 by FADIA CENTENO      Morphine Sulfate ER (Morphine Sulfate ER) 15 Mg Tablet.er      15 MG PO Q12HR, #60 TAB         Prov: FADIA CENTENO         10/26/20       HYDROcodone-Acetaminophen 5-325 Mg (HYDROcodone-Acetaminophen 5-325 Mg) 1 Each     Tablet      1-2 TAB PO Q4H PRN for PAIN, #60 TAB         Prov: FADIA CENTENO         10/26/20       Polyethylene Glycol (Miralax*) 17 Gm Packet      17 GM PO QDAY, #30 PKT 0 Refills         Prov: FADIA CENTENO         9/14/20       Loperamide (Loperamide) 2 Mg Capsule      2 MG PO ASDIR, #60 TAB 5 Refills         Prov: FADIA CENTENO         8/25/20       Ondansetron Hcl (ONDANSETRON HCL) 8 Mg Tablet      8 MG PO Q8H PRN for NAUSEA, #30 TAB 3 Refills         Prov: FADIA CENTENO         8/20/20       Prochlorperazine (Compazine) 5 Mg Tab      10 MG PO TID for NAUSEA, #30 TAB 3 Refills         Prov: FADIA CENTENO         8/20/20       Zinc Oxide 20% Ointment (Zinc Oxide 20% Ointment) 28.35 Gm Oint...g.      1 APL TOPICAL TID for 30 Days, #1 TUBE         Prov: Billy Murray         8/18/20       Docusate Sodium (Docusate Sodium) 100 Mg Capsule      100 MG PO QDAY, CAP         Reported         8/7/20      Medications Reviewed:  No Changes made to meds            IMPRESSION/PLAN      Diagnosis      Anemia due to chemotherapy - D64.81, T45.1X5A            Rectal cancer - C20            Notes      New Medications      * HYDROcodone-Acetaminophen 5-325 Mg 1 EACH TABLET: 1-2 TAB PO Q4H PRN PAIN #60         Dx: S/P colostomy - Z93.3      * Morphine Sulfate ER 15 MG TABLET.ER: 15 MG PO Q12HR #60      New Diagnostics      * Iron Profile, Routine         Dx: Anemia due to chemotherapy - D64.81,  T45.1X5A      * Ferritin Level, Routine         Dx: Anemia due to chemotherapy - D64.81, T45.1X5A      * CT Abd/Pelvis/Chest W/Contrast, Week         Dx: Rectal cancer - C20            Plan      Rectal adenocarcinoma: Patient has extensive disease throughout her pelvis with     local extension into adjacent structures including the vaginal wall and urinary     tract.  She underwent diverting ostomy which was initially complicated by bowel     obstruction.  Since then the patient has completed 4 cycles of chemotherapy.      She is here today for cycle 5.  I reviewed her labs and they are adequate to     proceed to treatment today.  She does have new elevation in AST and ALT.      However there is no need for modification of chemotherapy.  We will plan to get     a CT CAP with contrast prior to her next appointment.            Transaminitis: Patient has a twofold increase in her AST and ALT but normal     bilirubin.  This could be secondary to liver involvement or likely chemotherapy.     We will proceed with today's chemotherapy without modification.            Poor appetite and malnutrition: We discussed adding an appetite stimulating     medication again.  Patient continues to decline this option.  She says that she     feels her appetite is good and she is having high-calorie shakes to her diet.            Iron deficiency anemia: Patient was treated with supplemental iron in August.      We will recheck iron studies today and determine if the patient needs additional    iron.            Patient Education      Patient Education Provided:  Yes            Electronically signed by FADIA CENTENO  10/26/2020 22:53       Disclaimer: Converted document may not contain table formatting or lab diagrams. Please see [employer] [facility] for the authenticated document.

## 2021-05-28 NOTE — PROGRESS NOTES
Patient: ROBERTO CARLOS LANE     Acct: [id number]     Report: #[id number]  UNIT #: [id number]     : 1955    Encounter Date:2020  PRIMARY CARE:   ***Signed***  --------------------------------------------------------------------------------------------------------------------  NURSE INTAKE      Visit Type      Established Patient Visit            Chief Complaint      RECTAL CA            History and Present Illness      Past Oncology Illness History      Locally Advanced Rectal cancer:             - Referred to me by Dr. Silvia Granados.       - CT of the abdomen/pelvis on 7/10/2020 showed 1) diffuse wall thickening in the    distal rectum.  Heterogeneous enhancement of the perineum.  2) inguinal     adenopathy measuring up to 2.5 cm.  3) 3 mm noncalcified right lower lobe     nodule.  4) wall thickening in the fundus of the gallbladder with faint     calcification.  Focal adenomatosis is favored over neoplasm.  5) intrahepatic     and extrahepatic biliary duct dilation.      - Biopsy of anal mass on 2020.  Pathology was positive for adenocarcinoma,     poorly differentiated.      - CT of the chest on 2020 showed a 3 mm nodule in the right lower lobe and     a 3 mm nodule in the left upper lobe.      - diverting colostomy on 2020 complicated by small bowel obstruction due to    adhesions, hospitalized from 2020 to 2020.      - MRI pelvis 2020: 1. Locally advanced low rectal cancer with significant     involvement of the anal sphincteric complex and localized invasion of the     vagina.  Findings are compatible with T4b tumor. 2. Bulbous appearance of the     urethra raises question of possible urethral involvement with tumor. 3. Davis    ateral inguinal adenopathy compatible with ryne involvement. 4. Nonspecific     pelvic subcutaneous edema and vulvar edema.             Cycle 1-5 of FOLFOX from 2020 to 10/26/2020             CT CAP on 2020: New 6 mm noncalcified  nodule in the right lower lobe     suspicious for metastatic disease.  Mild splenomegaly.  Moderate intra and     extrahepatic bile duct dilation unchanged.  Ill-defined mass in the rectum and     perineum is smaller.  This mass previously measured 5.6 cm x 7.5 cm and now     measures 5.2 cm x 4 cm, indicating it has shrunk by at least half.            Cycle 6: 11/9/2020             Chemotherapy held on 11/23/2020 due to elevated AST/ALT and worsening pain.  She    is having worsening progression of local disease despite CT scan showing     improvement in the size of the primary mass.             Cycle 7: 12/7/20 (10% reduction in 5FU due to prior transaminitis)            HPI - Oncology Interim      Patient comes in today for her next dose of chemotherapy with FOLFOX.  Her pain     continues 2 be severe.  She tells me that Dr. Garay plans due to radiation     therapy since she is having worsening local progression.  She has CT simulation     on the 16th.            I have reviewed her labs today which show that her AST and ALT have improved.      This elevation was likely secondary two 5-FU.  For this reason I will decrease     by 10% today and proceed with treatment.            Patient states that overall she feels she is doing well.  She has gained a     couple of pounds even though the tumor is growing and she is having significant     pain.  She tells me that Dr. Garay sent an appetite stimulant medication.            Clinical Staging      At least cT4bN2 disease, stage IIIc.      Pulmonary nodules too small to characterize.            ECOG Performance Status      2            Most Recent Lab Findings      Laboratory Tests      11/23/20 09:01            11/23/20 09:15            Laboratory Tests            Test       11/23/20      09:01             Magnesium Level       2.25 mg/dL      (1.60-2.30)            PAST, FAMILY   Past Medical History      Past Medical History:  None      Hematology/Oncology (F):   None      Other Hematology History:        Rectal cancer.      Genetic/Metabolic:  None            Past Surgical History      Appendectomy, Biopsy, Hysterectomy            Family History      Family History:  Lung Cancer            Social History      Lives independently:  Yes      Number of Children:  2      Occupation:  RETIRED            Tobacco Use      Tobacco status:  Never smoker            Substance Use      Substance use:  Denies use            REVIEW OF SYSTEMS      General:  Admits: Appetite Change, Fatigue, Weight Gain (Only 1- 2 pounds);          Denies: Fever, Night Sweats, Weight Loss      Eye:  Denies Blurred Vision, Denies Corrective Lenses, Denies Diplopia, Denies     Vision Changes      ENT:  Denies Headache, Denies Hearing Loss, Denies Hoarseness, Denies Sore     Throat      Cardiovascular:  Denies Chest Pain, Denies Palpitations      Respiratory:  Denies: Cough, Coughing Blood, Productive Cough, Shortness of Air,    Wheezing      Gastrointestinal:  Denies Bloody Stools, Denies Constipation, Denies Diarrhea,     Denies Nausea/Vomiting, Denies Problem Swallowing, Denies Unable to Control     Bowels      Genitourinary:  Denies Blood in Urine, Denies Incontinence, Denies Painful     Urination      Musculoskeletal:  Denies Back Pain; Admits Muscle Pain; Denies Painful Joints      Integumentary:  Denies Itching, Denies Lesions, Denies Rash      Neurologic:  Denies Dizziness, Denies Numbness\Tingling, Denies Seizures      Psychiatric:  Denies Anxiety, Denies Depression      Endocrine:  Denies Cold Intolerance, Denies Heat Intolerance      Hematologic/Lymphatic:  Denies Bruising, Denies Bleeding, Denies Enlarged Lymph     Nodes      Reproductive:  Denies: Menopause, Heavy Periods, Pregnant, Still Menstruating            VITAL SIGNS AND SCORES      Vitals      Weight 113 lbs 1.536 oz / 51.3 kg      Temperature 97.2 F / 36.22 C - Temporal      Pulse 68      Respirations 20      Blood Pressure 126/58  Sitting      Pulse Oximetry 100%, RM AIR            Pain Score      Experiencing any pain?:  Yes      Pain Scale Used:  Numerical      Pain Intensity:  8            Fatigue Score      Experiencing any fatigue?:  Yes      Fatigue (0-10 scale):  6            EXAM      General:  moderate distress due to pain, stands throughout the visit, cachectic     and chronically ill-appearing although with reasonable energy      Eyes: Anicteric sclera, PERRLA      Respiratory: CTAB, normal respiratory effort      Abdomen: Normal active bowel sounds, no tenderness, no distention, ostomy     appears normal      Cardiovascular: RRR, no murmur, no lower extremity edema      Skin: Normal tone, no rash, no lesions      Psychiatric: Appropriate affect, intact judgment      Neurologic: No focal sensory or motor deficits, no weakness, numbness, dizziness      Musculoskeletal: Normal muscle strength and tone      Extremities: No clubbing, cyanosis, or deformities            PREVENTION      Hx Influenza Vaccination:  No      Influenza Vaccine Declined:  Yes      2 or More Falls in Past Year?:  No      Fall Past Year with Injury?:  No      Hx Pneumococcal Vaccination:  No      Encouraged to follow-up with:  PCP regarding preventative exams.      Chart initiated by      JO-ANN BROWN MA            ALLERGY/MEDS      Allergies      Coded Allergies:             NO KNOWN ALLERGIES (Verified , 12/7/20)            Medications      Last Reconciled on 11/16/20 12:55 by FADIA CENTENO      Mirtazapine (Mirtazapine*) 15 Mg Tablet      15 MG PO HS for 30 Days, #30 TAB 5 Refills         Prov: FADIA CENTENO         11/24/20       Morphine Sulfate ER (Morphine Sulfate ER) 15 Mg Tablet.er      15 MG PO Q8, #90 TAB         Prov: FADIA CENTENO         11/24/20       HYDROcodone-Acetaminophen 5-325 Mg (HYDROcodone-Acetaminophen 5-325 Mg) 1 Each     Tablet      1-2 TAB PO Q4H PRN for PAIN, #60 TAB         Prov: FADIA CENTENO         11/24/20       Doxycycline  Monohydrate (Doxycycline Monohydrate) 100 Mg Capsule      100 MG PO BID for 7 Days, #14 CAP         Prov: FADIA CENTENO         11/23/20       Polyethylene Glycol (Miralax*) 17 Gm Packet      17 GM PO QDAY, #30 PKT 0 Refills         Prov: FADIA CENTENO         9/14/20       Loperamide (Loperamide) 2 Mg Capsule      2 MG PO ASDIR, #60 TAB 5 Refills         Prov: FADIA CENTENO         8/25/20       Ondansetron Hcl (ONDANSETRON HCL) 8 Mg Tablet      8 MG PO Q8H PRN for NAUSEA, #30 TAB 3 Refills         Prov: FADIA CENTENO         8/20/20       Prochlorperazine (Compazine) 5 Mg Tab      10 MG PO TID for NAUSEA, #30 TAB 3 Refills         Prov: FADIA CENTENO         8/20/20       Zinc Oxide 20% Ointment (Zinc Oxide 20% Ointment) 28.35 Gm Oint...g.      1 APL TOPICAL TID for 30 Days, #1 TUBE         Prov: Billy Murray         8/18/20       Docusate Sodium (Docusate Sodium) 100 Mg Capsule      100 MG PO QDAY, CAP         Reported         8/7/20      Medications Reviewed:  No Changes made to meds            IMPRESSION/PLAN      Plan      Rectal adenocarcinoma: Patient has at least stage IIIc disease with extension of    the mass throughout her pelvis and into the vaginal wall and urinary tract. The     external mass is now enlarging but the internal portion of the mass appeared on     prior scan to be somewhat improved.  She is soon start radiation.  She will get     cycle 7 of FOLFOX today with a reduced dose of 5-FU due to transaminitis.  Thin     during radiation therapy I will plan for give 5-FU alone or 5-FU with     leucovorin.  I doubt she will tolerate oral Xeloda.  I will look into various     protocols and determine which option will work best for the patient regarding     this treatment plan.  Options include continuous 5-FU throughout the radiation     therapy  Versus daily injections of 5-FU and leucovorin Monday through Friday.            Rectal pain: Due to underlying malignancy. Continue MS Contin q8  hours and     oxycodone q4 hours PRN.  Patient is using more oxycodone since the local disease    is progressing.             Transaminitis: Patient's AST and ALT increased  previously that improved after     holding chemotherapy for 1 week.  This is likely secondary to 5-FU.  We will     decrease by 10% today.            Malnutrition: Patient is getting supplemental nutrition.  Her weight is     stable/improved a couple of pounds. Started mirtazapine 15mg qHS.  Will increase    if she tolerates it well.            Patient Education      Patient Education Provided:  Yes            Electronically signed by FADIA CENTENO  12/27/2020 21:25       Disclaimer: Converted document may not contain table formatting or lab diagrams. Please see [employer] [facility] for the authenticated document.

## 2021-05-28 NOTE — PROGRESS NOTES
"Patient: YAO MCCLURE     Acct: [id number]     Report: #[id number]  UNIT #: [id number]     : 1955    Encounter Date:2021  PRIMARY CARE:   ***Signed***  --------------------------------------------------------------------------------------------------------------------  TELEHEALTH NOTE      Past Oncology Illness History      Locally Advanced Rectal cancer:       - Biopsy of anal mass on 2020.  Pathology was positive for adenocarcinoma,     poorly differentiated.      - Initially diagnosed with extensive local disease, T4b, with invasion into the     vagina and possibly urethra.      - Cycle 1-6 of FOLFOX from 2020 to 2020 with partial response of the     primary portion of the tumor but progression of the external labial mass      - Cycle 7: 20 (10% reduction in 5FU due to prior transaminitis)       - Started 5-FU/LV with concurrent radiation to the rectum and anal canal on     2020            History of Present Illness            Chief Complaint: (RECTAL CA)            Yao Mcclure is presenting for evaluation via Telehealth visit by phone.     Verbal consent obtained before beginning visit.            Provider spent (6) minutes with the patient during telehealth visit.            The following staff were present during the visit: (Addie Vasquez, RN)                         Overview of Symptoms      I contacted the patient to follow-up on her clinical status toward the end of     radiation therapy.  I also wanted to make sure that her pain medication was     adequately controlling her symptoms.  She says that she is really fatigued and     sometimes \"out of it\".  In fact she was very confused when I called her as she     did not remember that we had an appointment today.  She has 1 more day of     radiation therapy left and is experiencing significant blistering of the skin.      We discussed the plan for chemotherapy after radiation is complete.  I told her     that she " would likely need about 4 weeks off radiation before restarting     chemotherapy.  However, I would talk with Dr. Couch about his specific     recommendations.            Patient says her pain is fairly well controlled.  She is still taking 30 mg MS     Contin twice a day as well as oxycodone 10 mg IR several times a day.  She is     also continuing her gabapentin which helped significantly.  She does not need a     refill at this time.            Allergies/Medications      Allergies:        Coded Allergies:             NO KNOWN ALLERGIES (Verified , 2/8/21)      Medications    Last Reconciled on 2/8/21 22:11 by FADIA CENTENO      Morphine Sulfate ER (Morphine Sulfate ER) 100 Mg Tablet.er      100 MG PO Q12HR, #60 TAB.SR         Prov: PA COUCH         1/26/21       Gabapentin (Gabapentin) 300 Mg Capsule      300 MG PO TID, #90 CAP 5 Refills         Prov: FADIA CENTENO         1/19/21       Morphine Sulfate ER (Morphine Sulfate ER) 60 Mg Tablet.er      60 MG PO Q12HR, #60 TAB.SR         Prov: PA COUCH         1/5/21       oxyCODONE IR (oxyCODONE IR) 10 Mg Tablet      10 MG PO Q3H PRN for PAIN, #120 TAB         Prov: FADIA CENTENO         12/21/20       Mirtazapine (Mirtazapine*) 15 Mg Tablet      15 MG PO HS for 30 Days, #30 TAB 5 Refills         Prov: FADIA CENTENO         11/24/20       Polyethylene Glycol (Miralax*) 17 Gm Packet      17 GM PO QDAY, #30 PKT 0 Refills         Prov: FADIA CENTENO         9/14/20       Loperamide (Loperamide) 2 Mg Capsule      2 MG PO ASDIR, #60 TAB 5 Refills         Prov: FADIA CENTENO         8/25/20       Ondansetron Hcl (ONDANSETRON HCL) 8 Mg Tablet      8 MG PO Q8H PRN for NAUSEA, #30 TAB 3 Refills         Prov: FADIA CENTENO         8/20/20       Prochlorperazine (Compazine) 5 Mg Tab      10 MG PO TID for NAUSEA, #30 TAB 3 Refills         Prov: FADIA CENTENO         8/20/20       Docusate Sodium (Docusate Sodium) 100 Mg Capsule      100 MG PO QDAY, CAP          Reported         8/7/20            Plan/Instructions            * Plan Of Care:       * Rectal cancer -patient has extensive local disease and was initially treated       with 6 cycles of FOLFOX.  The primary tumor location did respond to some       degree but she had some progression in the labial mass.  She is currently       undergoing concurrent chemoradiation.  I will follow up with her in 4 weeks to      discuss switching to FOLFIRI chemotherapy.  I did review the risks and       benefits of those medications again with the patient      * Nutrition: Patient feels that her weight is stable and she is currently       mirtazapine.      * Rectal pain: Patient symptoms are well enough controlled on 30 mg MS Contin       twice a day with oxycodone 10 mg every 3 hours as needed and gabapentin 300 mg      3 times daily.            * Chronic conditions reviewed and taken into consideration for today's treatment      plan.      * Patient instructed to seek medical attention urgently for new or worsening       symptoms.      * Patient was educated/instructed on their diagnosis, treatment and medications       prior to discharge from the clinic today.      Codes:  Phone Eval 5-10 min [id number]            Electronically signed by FADIA CENTENO  02/08/2021 22:12       Disclaimer: Converted document may not contain table formatting or lab diagrams. Please see [employer] for the authenticated document.

## 2021-05-28 NOTE — PROGRESS NOTES
Patient: ROBERTO CARLOS MCCLURE     Acct: [id number]     Report: #[id number]  UNIT #: [id number]     : 1955    Encounter Date:2020  PRIMARY CARE:   ***Signed***  --------------------------------------------------------------------------------------------------------------------  NURSE INTAKE      Visit Type      Established Patient Visit            Chief Complaint      RECTAL CA            History and Present Illness      Past Oncology Illness History      Ms. Mcclure comes in today with her  to discuss her new diagnosis of Rectal    cancer.  She was referred to me by Dr. Silvia Granados.             CT of the abdomen/pelvis on 7/10/2020 showed 1) diffuse wall thickening in the     distal rectum.  Heterogeneous enhancement of the perineum.  2) inguinal     adenopathy measuring up to 2.5 cm.  3) 3 mm noncalcified right lower lobe     nodule.  4) wall thickening in the fundus of the gallbladder with faint     calcification.  Focal adenomatosis is favored over neoplasm.  5) intrahepatic     and extrahepatic biliary duct dilation.            Biopsy of anal mass on 2020.  Pathology was positive for adenocarcinoma,     poorly differentiated.            CT of the chest on 2020 showed a 3 mm nodule in the right lower lobe and a     3 mm nodule in the left upper lobe.            Patient was taken to the operating room for port placement and a diverting     colostomy on 2020.  I was called on 2020 when the patient had MRI of     the abdomen and MRCP and inform that she had evidence of a small bowel     obstruction.  I contacted the patient and sent her to the hospital for eval    uation and treatment.  She was admitted from 2020 to 2020 and had to     return to the operating room for lysis of adhesions.            20:        MRI abdomen:      1. Fluid distended esophagus, stomach, and small bowel highly suspicious for     high-grade small bowel obstruction.  A transition point  is not clearly     identified in part due to MR technique and field of view.  CT may be helpful for    further evaluation of the exact location of a suspected transition point.      2. Abnormal predominantly extrahepatic biliary ductal dilatation.  No definite     choledocholithiasis is present however it would be difficult to fully exclude     due to the quality of the exam.  Ampullary stenosis or stricture could be     present.  If patient has abnormal liver function tests or an elevated bilirubin,    further evaluation with ERCP could be considered for definitive evaluation.      3. Cholelithiasis.              MRI pelvis:       1. Locally advanced low rectal cancer with significant involvement of the anal     sphincteric complex and localized invasion of the vagina.  Findings are     compatible with T4b tumor.      2. Bulbous appearance of the urethra raises question of possible urethral     involvement with tumor.  Correlate clinically for symptoms of urinary     retention/obstruction.      3. Bilateral inguinal adenopathy compatible with ryne involvement.      4. Nonspecific pelvic subcutaneous edema and vulvar edema.             CT abdomen/pelvis on 8/7/2020:      CONCLUSION:         1. High-grade small bowel obstruction with a discrete transition zone within the    region of the mid jejunum in the left lower quadrant of the abdomen.      2. Intrahepatic and extrahepatic biliary ductal dilatation without a discrete     mass or stone identified.      3. Ulcerating mass involving the rectum, perineum, and vagina with metastatic     bilateral inguinal adenopathy            Cycle 1 of FOLFOX on 8/31/2020      Cycle 2 of FOLFOX on 9/14/2020            HPI - Oncology Interim      Patient comes in today for her second cycle of chemotherapy.  I reviewed her     labs and they are adequate for treatment but she is more anemic.  She says     overall she tolerated the first cycle of chemotherapy well.  She was surprised      that her appetite has significantly improved.  Her pain has also been fairly     well-controlled until today.  She was very uncomfortable sitting in or moving so    I came over to the infusion side to see and examine her today.  We discussed     replacement with IV iron to get a quick response since she is severely iron     deficient.            ECOG Performance Status      1            Most Recent Lab Findings      Laboratory Tests      8/31/20 09:45            9/14/20 08:59            Laboratory Tests            Test       8/31/20      09:45 9/14/20      08:58             Ferritin       56 ng/mL      ()                    Magnesium Level              2.17 mg/dL      (1.60-2.30)            PAST, FAMILY   Past Medical History      Past Medical History:  None      Hematology/Oncology (F):  None      Other Hematology History:        Rectal cancer.      Genetic/Metabolic:  None            Past Surgical History      Appendectomy, Biopsy, Hysterectomy            Family History      Family History:  Lung Cancer            Social History      Lives independently:  Yes      Number of Children:  2      Occupation:  RETIRED            Tobacco Use      Tobacco status:  Never smoker            Substance Use      Substance use:  Denies use            REVIEW OF SYSTEMS      General:  Denies: Appetite Change, Fatigue, Fever, Night Sweats, Weight Gain,     Weight Loss      Eye:  Denies Blurred Vision, Denies Corrective Lenses, Denies Diplopia, Denies     Vision Changes      ENT:  Denies Headache, Denies Hearing Loss, Denies Hoarseness, Denies Sore     Throat      Cardiovascular:  Denies Chest Pain, Denies Palpitations      Respiratory:  Denies: Cough, Coughing Blood, Productive Cough, Shortness of Air,    Wheezing      Gastrointestinal:  Denies Bloody Stools, Denies Constipation, Denies Diarrhea,     Denies Nausea/Vomiting, Denies Problem Swallowing, Denies Unable to Control Jason    ls      Genitourinary:  Denies Blood in  Urine, Denies Incontinence, Denies Painful U    rination      Other      Abdominal and rectal pain      Musculoskeletal:  Denies Back Pain, Denies Muscle Pain, Denies Painful Joints      Integumentary:  Denies Itching, Denies Lesions, Denies Rash      Neurologic:  Denies Dizziness, Denies Numbness\Tingling, Denies Seizures      Psychiatric:  Denies Anxiety, Denies Depression      Endocrine:  Denies Cold Intolerance, Denies Heat Intolerance      Hematologic/Lymphatic:  Denies Bruising, Denies Bleeding, Denies Enlarged Lymph     Nodes      Reproductive:  Denies: Menopause, Heavy Periods, Pregnant, Still Menstruating            VITAL SIGNS AND SCORES      Pain Score      Pain Scale Used:  Numerical            Fatigue Score      Fatigue (0-10 scale):  0 (none)            EXAM      General: Alert, cooperative, no acute distress, cachectic and chronically ill-    appearing      Eyes: Anicteric sclera, PERRLA      Respiratory: CTAB, normal respiratory effort      Abdomen: Normal active bowel sounds, no tenderness, no distention      Cardiovascular: RRR, no murmur, no peripheral edema      Skin: Normal tone, no rash, no lesions, 5 abdominal staples remain in place but     are clean and dry without any sign of infection or opening in the skin      Psychiatric: Appropriate affect, intact judgment      Neurologic: No focal sensory or motor deficits, no weakness, numbness, dizziness      Musculoskeletal: Normal muscle strength, normal muscle tone      Extremities: No clubbing, cyanosis, or deformities            PREVENTION      Influenza Vaccine Declined:  Yes      2 or More Falls in Past Year?:  No      Fall Past Year with Injury?:  No      Encouraged to follow-up with:  PCP regarding preventative exams.      Chart initiated by      DM AMEZQUITA MA            ALLERGY/MEDS      Allergies      Coded Allergies:             NO KNOWN ALLERGIES (Verified , 9/14/20)            Medications      Last Reconciled on 9/16/20 23:21 by  FADIA CENTENO      Polyethylene Glycol (Miralax*) 17 Gm Packet      17 GM PO QDAY, #30 PKT 0 Refills         Prov: FADIA CENTENO         9/14/20       Morphine Sulfate ER (Morphine Sulfate ER) 15 Mg Tablet.er      15 MG PO Q12HR, #60 TAB         Prov: FADIA CENTENO         8/31/20       Loperamide (Loperamide) 2 Mg Capsule      2 MG PO ASDIR, #60 TAB 5 Refills         Prov: FADIA CENTENO         8/25/20       Ondansetron Hcl (ONDANSETRON HCL) 8 Mg Tablet      8 MG PO Q8H PRN for NAUSEA, #30 TAB 3 Refills         Prov: FADIA CENTENO         8/20/20       Prochlorperazine (Compazine) 5 Mg Tab      10 MG PO TID for NAUSEA, #30 TAB 3 Refills         Prov: FADIA CENTENO         8/20/20       Zinc Oxide 20% Ointment (Zinc Oxide 20% Oint*) 28.35 Gm Oint...g.      1 APL TOPICAL TID for 30 Days, #1 TUBE         Prov: Billy Murray         8/18/20       Docusate Sodium (Docusate Sodium) 100 Mg Capsule      100 MG PO QDAY, CAP         Reported         8/7/20       HYDROcodone-Acetaminophen 5-325 Mg (HYDROcodone-Acetaminophen 5-325 Mg) 1 Each     Tablet      1-2 TAB PO Q4H PRN for PAIN, #36 TAB         Prov: CHRIS DANIELS         7/31/20      Medications Reviewed:  No Changes made to meds            IMPRESSION/PLAN      Impression      65-year-old female with rectal adenocarcinoma here for her second cycle of     FOLFOX            Diagnosis      Notes      Renewed Medications      * POLYETHYLENE GLYCOL (Miralax*) 17 GM PACKET: 17 GM PO QDAY #30            Plan      Rectal adenocarcinoma: Patient has extensive disease throughout her pelvis with     local invasion into adjacent structures including the vaginal wall and likely     urinary tract.  Patient underwent diverting ostomy which was complicated by     bowel obstruction.  She has since been doing better and is here for her second     cycle of chemotherapy with FOLFOX.            Rectal pain: Secondary to patient's underlying malignancy.  Her pain is     currently  well controlled with 15 mg of MS Contin twice a day as well as     hydrocodone 5 mg every 4 hours.            Iron deficiency anemia: Secondary to the patient's history of GI bleeding.  Will    start IV iron supplementation today.            Patient Education      Patient Education Provided:  Yes            Electronically signed by FADIA CENTENO  09/16/2020 23:21       Disclaimer: Converted document may not contain table formatting or lab diagrams. Please see [employer] for the authenticated document.

## 2021-05-28 NOTE — PROGRESS NOTES
Patient: ROBERTO CARLOS LANE     Acct: [id number]     Report: #[id number]  UNIT #: [id number]     : 1955    Encounter Date:2021  PRIMARY CARE:   ***Signed***  --------------------------------------------------------------------------------------------------------------------  NURSE INTAKE      Visit Type      Established Patient Visit            Chief Complaint      RECTAL CA            History and Present Illness      Past Oncology Illness History      Locally Advanced Rectal cancer:             - Referred to me by Dr. Silvia Granados.       - CT of the abdomen/pelvis on 7/10/2020 showed 1) diffuse wall thickening in the    distal rectum.  Heterogeneous enhancement of the perineum.  2) inguinal     adenopathy measuring up to 2.5 cm.  3) 3 mm noncalcified right lower lobe     nodule.  4) wall thickening in the fundus of the gallbladder with faint     calcification.  Focal adenomatosis is favored over neoplasm.  5) intrahepatic     and extrahepatic biliary duct dilation.      - Biopsy of anal mass on 2020.  Pathology was positive for adenocarcinoma,     poorly differentiated.      - CT of the chest on 2020 showed a 3 mm nodule in the right lower lobe and     a 3 mm nodule in the left upper lobe.      - diverting colostomy on 2020 complicated by small bowel obstruction due to    adhesions, hospitalized from 2020 to 2020.      - MRI pelvis 2020: 1. Locally advanced low rectal cancer with significant     involvement of the anal sphincteric complex and localized invasion of the     vagina.  Findings are compatible with T4b tumor. 2. Bulbous appearance of the     urethra raises question of possible urethral involvement with tumor. 3. B    ilateral inguinal adenopathy compatible with ryne involvement. 4. Nonspecific     pelvic subcutaneous edema and vulvar edema.             Cycle 1-5 of FOLFOX from 2020 to 10/26/2020             CT CAP on 2020: New 6 mm noncalcified  nodule in the right lower lobe     suspicious for metastatic disease.  Mild splenomegaly.  Moderate intra and     extrahepatic bile duct dilation unchanged.  Ill-defined mass in the rectum and     perineum is smaller.  This mass previously measured 5.6 cm x 7.5 cm and now     measures 5.2 cm x 4 cm, indicating it has shrunk by at least half.            Cycle 6: 11/9/2020             Chemotherapy held on 11/23/2020 due to elevated AST/ALT and worsening pain.  She    is having worsening progression of local disease despite CT scan showing     improvement in the size of the primary mass.             Cycle 7: 12/7/20 (10% reduction in 5FU due to prior transaminitis)             Started 5-FU with concurrent radiation to the rectum and anal canal on     12/21/2020.  Second week of 5-FU/leucovorin on 1/18/2021            Cycle 1 FOLFIRI on 3/9/2021            HPI - Oncology Interim      Patient comes in today for her next cycle of FOLFIRI.  As usual we spent most of    the conversation talking about the response of her skin on her right leg.  She     feels overall she is getting better.  The swelling and pain have improved.  It     is unclear what has led to the improvement, whether it is the chemotherapy     regimen or her continued wrapping of her legs which is guided by [facility] and     Occupational Therapy.  I suspect the chemotherapy is holding the disease study     at best.  I believe she has gotten significant relief from mobilization of the     fluid.  Regardless, swelling in both legs has improved as well as color.  She     still has an extensive rash on her right upper leg which is likely related to     the underlying malignancy.  Patient tells me that she started to feel strange     and no longer had clear thinking.  For that reason she decided to stop her da    ytime MS Contin and has only taking it at night.  She is taking 3 to 4 tablets     of oxycodone during the day but overall feels much better.  She is also  taking     gabapentin 600 mg 3 times daily.            Clinical Staging      At least cT4bN2 disease, stage IIIc.      Pulmonary nodules too small to characterize.            ECOG Performance Status      2            Most Recent Lab Findings      Laboratory Tests      4/20/21 09:45            5/4/21 10:15            Laboratory Tests            Test       4/20/21      09:45             Magnesium Level       2.20 mg/dL      (1.60-2.30)            PAST, FAMILY   Past Medical History      Past Medical History:  None      Hematology/Oncology (F):  None      Other Hematology History:        Rectal cancer.      Genetic/Metabolic:  None            Past Surgical History      Appendectomy, Biopsy, Hysterectomy            Diverting ostomy            Family History      Family History:  Lung Cancer            Social History      Lives independently:  Yes      Number of Children:  2      Occupation:  RETIRED            Tobacco Use      Tobacco status:  Never smoker            Substance Use      Substance use:  Denies use            REVIEW OF SYSTEMS      General:  Admits: Fatigue;          Denies: Appetite Change, Fever, Night Sweats, Weight Gain, Weight Loss      Eye:  Denies Blurred Vision, Denies Corrective Lenses, Denies Diplopia, Denies     Vision Changes      ENT:  Denies Headache, Denies Hearing Loss, Denies Hoarseness, Denies Sore     Throat      Cardiovascular:  Denies Chest Pain, Denies Palpitations      Respiratory:  Denies: Cough, Coughing Blood, Productive Cough, Shortness of Air,    Wheezing      Gastrointestinal:  Denies Bloody Stools, Denies Constipation, Denies Diarrhea,     Denies Nausea/Vomiting, Denies Problem Swallowing, Denies Unable to Control     Bowels      Genitourinary:  Denies Blood in Urine, Denies Incontinence, Denies Painful     Urination      Musculoskeletal:  Denies Back Pain, Denies Muscle Pain, Denies Painful Joints      Integumentary:  Denies Itching, Denies Lesions, Denies Rash       Neurologic:  Denies Dizziness, Denies Numbness\Tingling, Denies Seizures      Psychiatric:  Denies Anxiety, Denies Depression      Endocrine:  Denies Cold Intolerance, Denies Heat Intolerance      Hematologic/Lymphatic:  Denies Bruising, Denies Bleeding, Denies Enlarged Lymph     Nodes      Reproductive:  Denies: Menopause, Heavy Periods, Pregnant, Still Menstruating            VITAL SIGNS AND SCORES      Vitals      Weight 149 lbs 11.077 oz / 67.9 kg      Temperature 98.0 F / 36.67 C - Temporal      Pulse 100      Respirations 20      Blood Pressure 94/70 Sitting      Pulse Oximetry 100%, RM AIR            Pain Score      Experiencing any pain?:  Yes      Pain Scale Used:  Numerical      Pain Intensity:  7            Fatigue Score      Experiencing any fatigue?:  Yes      Fatigue (0-10 scale):  7            EXAM      General: Alert, cooperative, no acute distress, very thin and chronically ill-    appearing, cachectic      Eyes: Anicteric sclera, PERRLA      Respiratory: CTAB, normal respiratory effort      Abdomen: Normal active bowel sounds, no tenderness, no distention, ostomy bag in    place      Cardiovascular: RRR, no murmur, 3+ lower extremity edema which is actually     improved from prior      Skin: Extensive disease down the right leg is most consistent with underlying     malignancy, it is unchanged compared to last visit      Psychiatric: Appropriate affect, intact judgment      Neurologic: No focal sensory or motor deficits, no weakness, numbness, dizziness      Musculoskeletal: Reduced muscle strength and tone but still able to ambulate on     her own and stand without assistance      Extremities: No clubbing, cyanosis, or deformities            PREVENTION      Hx Influenza Vaccination:  No      Influenza Vaccine Declined:  Yes      2 or More Falls in Past Year?:  No      Fall Past Year with Injury?:  No      Hx Pneumococcal Vaccination:  No      Encouraged to follow-up with:  PCP regarding  preventative exams.      Chart initiated by      ROXANNE COREY [facility]            ALLERGY/MEDS      Allergies      Coded Allergies:             NO KNOWN ALLERGIES (Verified , 5/4/21)            Medications      Last Reconciled on 5/4/21 14:28 by FADIA CENTENO      Ondansetron Hcl (ONDANSETRON HCL) 8 Mg Tablet      8 MG PO Q8H PRN for NAUSEA, #30 TAB 3 Refills         Prov: FADIA CENTENO         4/30/21       Prochlorperazine (Compazine) 5 Mg Tab      10 MG PO TID for NAUSEA, #30 TAB 3 Refills         Prov: FADIA CENTENO         4/30/21       Gabapentin (Gabapentin) 300 Mg Capsule      600 MG PO TID for 30 Days, #180 CAP 5 Refills         Prov: FADIA CENTENO         4/20/21       Morphine Sulfate ER (Morphine Sulfate ER) 100 Mg Tablet.er      100 MG PO Q12HR, #60 TAB.SR         Prov: FADIA CENTENO         4/20/21       oxyCODONE IR (oxyCODONE IR) 10 Mg Tablet      10 MG PO Q3H PRN for PAIN, #120 TAB         Prov: FADIA CENTENO         4/20/21       Lidocaine (Lidocaine HCl Viscous) 20 Mg/Ml Solution      5 ML TOPICAL TID PRN for DISCOMFORT, #100 ML 3 Refills         Prov: FADIA CENTENO         4/6/21       oxyCODONE IR (oxyCODONE IR) 5 Mg Tablet      10 MG PO Q3H PRN for PAIN for 20 Days, #60 TAB 0 Refills         Prov: PA COUCH         2/16/21       Mirtazapine (Mirtazapine*) 15 Mg Tablet      15 MG PO HS for 30 Days, #30 TAB 5 Refills         Prov: FADIA CENTENO         11/24/20       Polyethylene Glycol (Miralax*) 17 Gm Packet      17 GM PO QDAY, #30 PKT 0 Refills         Prov: FADIA CENTENO         9/14/20       Loperamide (Loperamide) 2 Mg Capsule      2 MG PO ASDIR, #60 TAB 5 Refills         Prov: FADIA CENTENO         8/25/20       Docusate Sodium (Docusate Sodium) 100 Mg Capsule      100 MG PO QDAY, CAP         Reported         8/7/20      Medications Reviewed:  No Changes made to meds            IMPRESSION/PLAN      Plan      * Rectal cancer -patient has extensive local disease and was initially  treated w      ith 6 cycles of FOLFOX.  The primary tumor location did respond to some degree      but she had some progression in the labial mass. She then underwent concurrent      chemoRT with 5FU/Lv.  Unfortunately her cancer progressed further and she       appeared to have dermal metastatsis along the right thigh. After radiation she      was started on FOLFIRI and is tolerating it well.  Her labs are adequate for       chemotherapy.              * Dermal Metastasis and Lower Extremity Edema: Appears stable to improved.  She       will continue seeing OT for symptomatic care.             * Nutrition: Patient feels that her weight is stable and she is currently       mirtazapine.            * Rectal pain: Patient symptoms are tolerable on 30 mg MS Contin at night and       oxycodone 10 mg q4 hour PRN. She also takes gabapentin 600mg TID.            Patient Education      Patient Education Provided:  Yes            Electronically signed by FADIA CENTENO  05/04/2021 14:28       Disclaimer: Converted document may not contain table formatting or lab diagrams. Please see [employer] [facility] for the authenticated document.

## 2021-05-28 NOTE — PROGRESS NOTES
Patient: ROBERTO CARLOS LANE     Acct: [id number]     Report: #[id number]  UNIT #: [id number]     : 1955    Encounter Date:2020  PRIMARY CARE:   ***Signed***  --------------------------------------------------------------------------------------------------------------------  NURSE INTAKE      Visit Type      Established Patient Visit            Chief Complaint      RECTAL CANCER            History and Present Illness      Past Oncology Illness History      Locally Advanced Rectal cancer:             - Referred to me by Dr. Silvia Granados.       - CT of the abdomen/pelvis on 7/10/2020 showed 1) diffuse wall thickening in the    distal rectum.  Heterogeneous enhancement of the perineum.  2) inguinal     adenopathy measuring up to 2.5 cm.  3) 3 mm noncalcified right lower lobe     nodule.  4) wall thickening in the fundus of the gallbladder with faint     calcification.  Focal adenomatosis is favored over neoplasm.  5) intrahepatic     and extrahepatic biliary duct dilation.      - Biopsy of anal mass on 2020.  Pathology was positive for adenocarcinoma,     poorly differentiated.      - CT of the chest on 2020 showed a 3 mm nodule in the right lower lobe and     a 3 mm nodule in the left upper lobe.      - diverting colostomy on 2020 complicated by small bowel obstruction due to    adhesions, hospitalized from 2020 to 2020.      - MRI pelvis 2020: 1. Locally advanced low rectal cancer with significant     involvement of the anal sphincteric complex and localized invasion of the     vagina.  Findings are compatible with T4b tumor. 2. Bulbous appearance of the     urethra raises question of possible urethral involvement with tumor. 3.     Bilateral inguinal adenopathy compatible with ryne involvement. 4. Nonspecific     pelvic subcutaneous edema and vulvar edema.             Cycle 1-5 of FOLFOX from 2020 to 10/26/2020             CT CAP on 2020: New 6 mm  noncalcified nodule in the right lower lobe     suspicious for metastatic disease.  Mild splenomegaly.  Moderate intra and     extrahepatic bile duct dilation unchanged.  Ill-defined mass in the rectum and     perineum is smaller.  This mass previously measured 5.6 cm x 7.5 cm and now     measures 5.2 cm x 4 cm, indicating it has shrunk by at least half.            Cycle 6: 11/9/2020            [facility] - Oncology Interim      Patient comes in today for cycle 6 of chemotherapy.  I discussed the results of     the CT scan with her.  Although it does show a new 6 mm pulmonary nodule I am     doubtful that this is an area of metastatic disease given that her overall     response has been very good.  The read on the CT scan suggest that she has had a    slight improvement in the rectal/perineal mass.  However, the mass has shrunk by    at least half an overall volume.  Patient says that she also believes it is     shrunk because she is was having significantly less pain.  Over last week she     has had some worsening of the pain again in the early morning.  She is also had     some rectal bleeding.  She says that she is eating fairly well but has not been     able to gain any weight.  At least her weight is stable.  She has had some     nausea which she controls by eating biscotti.  She is using supplemental shakes.     She has developed a rash across her face over the past week but this is not     terribly bothersome.  She is now having normal bowel movements.            Clinical Staging      At least cT4bN2 disease, stage IIIc.      Pulmonary nodules too small to characterize.            ECOG Performance Status      2            Most Recent Lab Findings      Laboratory Tests      10/26/20 09:50            11/9/20 10:09            Laboratory Tests            Test       10/26/20      09:50 10/26/20      15:00             Magnesium Level       2.26 mg/dL      (1.60-2.30)                    Ferritin              849 ng/mL       ()            PAST, FAMILY   Past Medical History      Past Medical History:  None      Hematology/Oncology (F):  None      Other Hematology History:        Rectal cancer.      Genetic/Metabolic:  None            Past Surgical History      Appendectomy, Biopsy, Hysterectomy            Family History      Family History:  Lung Cancer            Social History      Lives independently:  Yes      Number of Children:  2      Occupation:  RETIRED            Tobacco Use      Tobacco status:  Never smoker            Substance Use      Substance use:  Denies use            REVIEW OF SYSTEMS      General:  Admits: Fatigue;          Denies: Appetite Change, Fever, Night Sweats, Weight Gain, Weight Loss      Eye:  Denies Blurred Vision, Denies Corrective Lenses, Denies Diplopia, Denies     Vision Changes      ENT:  Denies Headache, Denies Hearing Loss, Denies Hoarseness, Denies Sore     Throat      Cardiovascular:  Denies Chest Pain, Denies Palpitations      Respiratory:  Denies: Cough, Coughing Blood, Productive Cough, Shortness of Air,    Wheezing      Gastrointestinal:  Admits Bloody Stools, Admits Nausea/Vomiting; Denies     Constipation, Denies Diarrhea, Denies Problem Swallowing, Denies Unable to     Control Bowels      Genitourinary:  Denies Blood in Urine, Denies Incontinence, Denies Painful     Urination      Musculoskeletal:  Denies Back Pain, Denies Muscle Pain, Denies Painful Joints      Other      Rectal pain      Integumentary:  Denies Itching, Denies Lesions; Admits Rash      Neurologic:  Denies Dizziness, Denies Numbness\Tingling, Denies Seizures      Psychiatric:  Denies Anxiety, Denies Depression      Endocrine:  Denies Cold Intolerance, Denies Heat Intolerance      Hematologic/Lymphatic:  Denies Bruising, Denies Bleeding, Denies Enlarged Lymph     Nodes      Reproductive:  Denies: Menopause, Heavy Periods, Pregnant, Still Menstruating            VITAL SIGNS AND SCORES      Vitals      Weight 108 lbs  3.934 oz / 49.1 kg      Temperature 99.0 F / 37.22 C - Temporal      Pulse 69      Respirations 18      Blood Pressure 103/54 Sitting      Pulse Oximetry 98%, ROOM AIR            Pain Score      Experiencing any pain?:  Yes      Pain Scale Used:  Numerical      Pain Intensity:  6            Fatigue Score      Experiencing any fatigue?:  Yes      Fatigue (0-10 scale):  3            EXAM      General: Alert, cooperative, no acute distress, remains cachectic      Eyes: Anicteric sclera, PERRLA      HEENT: Oropharynx clear, no exudates      Respiratory: CTAB, normal respiratory effort      Abdomen: Normal active bowel sounds, diffusely tender, mild distention      Cardiovascular: RRR, no murmur, no peripheral edema      Skin: Normal tone, no rash, no lesions      Psychiatric: Appropriate affect, intact judgment      Neurologic: No focal sensory or motor deficits, no weakness, numbness, dizziness      Musculoskeletal: Normal muscle strength, normal muscle tone      Extremities: No clubbing, cyanosis, or deformities            PREVENTION      Hx Influenza Vaccination:  No      Influenza Vaccine Declined:  Yes      2 or More Falls in Past Year?:  No      Fall Past Year with Injury?:  No      Encouraged to follow-up with:  PCP regarding preventative exams.      Chart initiated by      DAINA LANGFORD            ALLERGY/MEDS      Allergies      Coded Allergies:             NO KNOWN ALLERGIES (Verified , 11/9/20)            Medications      Last Reconciled on 11/16/20 12:55 by FADIA CENTENO      HYDROcodone-Acetaminophen 5-325 Mg (HYDROcodone-Acetaminophen 5-325 Mg) 1 Each     Tablet      1-2 TAB PO Q4H PRN for PAIN, #60 TAB         Prov: FADIA CENTENO         11/11/20       Morphine Sulfate ER (Morphine Sulfate ER) 15 Mg Tablet.er      15 MG PO Q12HR, #60 TAB         Prov: FADIA CENTENO         10/26/20       Polyethylene Glycol (Miralax*) 17 Gm Packet      17 GM PO QDAY, #30 PKT 0 Refills         Prov: FADIA CENTENO          9/14/20       Loperamide (Loperamide) 2 Mg Capsule      2 MG PO ASDIR, #60 TAB 5 Refills         Prov: FADIA CENTENO         8/25/20       Ondansetron Hcl (ONDANSETRON HCL) 8 Mg Tablet      8 MG PO Q8H PRN for NAUSEA, #30 TAB 3 Refills         Prov: FADIA CENTENO         8/20/20       Prochlorperazine (Compazine) 5 Mg Tab      10 MG PO TID for NAUSEA, #30 TAB 3 Refills         Prov: FADIA CENTENO         8/20/20       Zinc Oxide 20% Ointment (Zinc Oxide 20% Ointment) 28.35 Gm Oint...g.      1 APL TOPICAL TID for 30 Days, #1 TUBE         Prov: Billy Murray         8/18/20       Docusate Sodium (Docusate Sodium) 100 Mg Capsule      100 MG PO QDAY, CAP         Reported         8/7/20      Medications Reviewed:  No Changes made to meds            IMPRESSION/PLAN      Diagnosis      Notes      New Medications      * HYDROcodone-Acetaminophen 5-325 Mg 1 EACH TABLET: 1-2 TAB PO Q4H PRN PAIN #60         Dx: S/P colostomy - Z93.3            Plan      Rectal adenocarcinoma: Patient has at least stage IIIc disease with extension of    the mass throughout her pelvis and into the vaginal wall and urinary tract.  She    underwent diverting ostomy prior to the start of chemotherapy.  Recent CT CAP     shows a new 6 mm pulmonary nodule of unclear significance.  However, the primary    mass has improved significantly.  We will continue with cycle 6 of FOLFOX today     which the patient is tolerating well.  I discussed her case with Dr. Garay who     feels radiation would not be beneficial at this time.  I will discuss this with     the patient at the next appointment and likely refer her back to Dr. Garay for     further discussion.            Rectal pain: Primary to underlying malignancy.  We will refill patient's     oxycodone.            Face rash: Likely secondary to chemotherapy.  Offered a prescription of d    oxycycline given that it is an actiniform rash.  However, the patient would like    to hold off on  medication for now.            Transaminitis: Patient's AST and ALT have improved.  This is likely secondary to    chemotherapy especially given that her bilirubin has remained within normal     limits and there is no evidence of liver involvement.            Malnutrition: Patient is getting supplemental nutrition.  Her weight is stable.     She would still like to hold off on appetite stimulating medication.            Patient Education      Patient Education Provided:  Yes            Electronically signed by FADIA CENTENO  11/16/2020 12:55       Disclaimer: Converted document may not contain table formatting or lab diagrams. Please see [facility] for the authenticated document.

## 2021-05-28 NOTE — PROGRESS NOTES
Patient: ROBERTO CARLOS LANE     Acct: [id number]     Report: #[id number]  UNIT #: [id number]     : 1955    Encounter Date:2020  PRIMARY CARE:   ***Signed***  --------------------------------------------------------------------------------------------------------------------  NURSE INTAKE      Visit Type      Established Patient Visit            Chief Complaint      RECTAL CA            History and Present Illness      Past Oncology Illness History      Locally Advanced Rectal cancer:             - Referred to me by Dr. Silvia Granados.       - CT of the abdomen/pelvis on 7/10/2020 showed 1) diffuse wall thickening in the    distal rectum.  Heterogeneous enhancement of the perineum.  2) inguinal     adenopathy measuring up to 2.5 cm.  3) 3 mm noncalcified right lower lobe     nodule.  4) wall thickening in the fundus of the gallbladder with faint     calcification.  Focal adenomatosis is favored over neoplasm.  5) intrahepatic     and extrahepatic biliary duct dilation.      - Biopsy of anal mass on 2020.  Pathology was positive for adenocarcinoma,     poorly differentiated.      - CT of the chest on 2020 showed a 3 mm nodule in the right lower lobe and     a 3 mm nodule in the left upper lobe.      - diverting colostomy on 2020 complicated by small bowel obstruction due to    adhesions, hospitalized from 2020 to 2020.      - MRI pelvis 2020: 1. Locally advanced low rectal cancer with significant     involvement of the anal sphincteric complex and localized invasion of the     vagina.  Findings are compatible with T4b tumor. 2. Bulbous appearance of the     urethra raises question of possible urethral involvement with tumor. 3. Davis    ateral inguinal adenopathy compatible with ryne involvement. 4. Nonspecific     pelvic subcutaneous edema and vulvar edema.             Cycle 1-5 of FOLFOX from 2020 to 10/26/2020             CT CAP on 2020: New 6 mm noncalcified  nodule in the right lower lobe     suspicious for metastatic disease.  Mild splenomegaly.  Moderate intra and     extrahepatic bile duct dilation unchanged.  Ill-defined mass in the rectum and     perineum is smaller.  This mass previously measured 5.6 cm x 7.5 cm and now     measures 5.2 cm x 4 cm, indicating it has shrunk by at least half.            Cycle 6: 11/9/2020. Held C7 on 11/23 due to elevated LFTs.            HPI - Oncology Interim      Patient comes in today for for cycle 7 of chemotherapy.  She says that her pain     had been well controlled but is starting to get worse.  She also had an episode     of what she describes as an abdominal spasm that started about 5:30 in the     morning on Saturday.  It was very severe and went into her chest.  She is never     experienced pain quite like that but it since improved.  She has increased the     number of breakthrough oxycodone that she has been eating.  She is also having     more vaginal bleeding.  She is unable to tell me if the mass has gotten larger.     I did review her labs before seeing her today and noted that her AST and ALT are    significantly elevated into the 100.  For that reason I will hold her     chemotherapy today.  I would like for her to be seen by Dr. Granados and Dr. Garay due to the worsening pain.            Clinical Staging      At least cT4bN2 disease, stage IIIc.      Pulmonary nodules too small to characterize.            ECOG Performance Status      2            Most Recent Lab Findings      Laboratory Tests      11/9/20 09:45            11/23/20 09:15            Laboratory Tests            Test       11/9/20      09:45             Magnesium Level       2.30 mg/dL      (1.60-2.30)            PAST, FAMILY   Past Medical History      Past Medical History:  None      Hematology/Oncology (F):  None      Other Hematology History:        Rectal cancer.      Genetic/Metabolic:  None            Past Surgical History       Appendectomy, Biopsy, Hysterectomy            Family History      Family History:  Lung Cancer            Social History      Lives independently:  Yes      Number of Children:  2      Occupation:  RETIRED            Tobacco Use      Tobacco status:  Never smoker            Substance Use      Substance use:  Denies use            REVIEW OF SYSTEMS      General:  Admits: Fatigue;          Denies: Appetite Change, Fever, Night Sweats, Weight Gain, Weight Loss      Eye:  Denies Blurred Vision, Denies Corrective Lenses, Denies Diplopia, Denies     Vision Changes      ENT:  Denies Headache, Denies Hearing Loss, Denies Hoarseness, Denies Sore     Throat      Cardiovascular:  Denies Chest Pain, Denies Palpitations      Respiratory:  Denies: Cough, Coughing Blood, Productive Cough, Shortness of Air,    Wheezing      Gastrointestinal:  Denies Bloody Stools, Denies Constipation, Denies Diarrhea,     Denies Nausea/Vomiting, Denies Problem Swallowing, Denies Unable to Control     Bowels      Genitourinary:  Denies Blood in Urine, Denies Incontinence, Denies Painful     Urination      Musculoskeletal:  Denies Back Pain, Denies Muscle Pain, Denies Painful Joints      Other      RECTAL PAIN      Integumentary:  Denies Itching, Denies Lesions, Denies Rash      Neurologic:  Denies Dizziness, Denies Numbness\Tingling, Denies Seizures      Psychiatric:  Denies Anxiety, Denies Depression      Endocrine:  Denies Cold Intolerance, Denies Heat Intolerance      Hematologic/Lymphatic:  Denies Bruising, Denies Bleeding, Denies Enlarged Lymph     Nodes      Reproductive:  Denies: Menopause, Heavy Periods, Pregnant, Still Menstruating            VITAL SIGNS AND SCORES      Vitals      Weight 107 lbs 12.879 oz / 48.9 kg      Temperature 98.9 F / 37.17 C - Temporal      Pulse 59      Respirations 18      Blood Pressure 104/60 Sitting      Pulse Oximetry 97%, RM AIR            Pain Score      Experiencing any pain?:  Yes      Pain Scale Used:   Numerical      Pain Intensity:  6            Fatigue Score      Experiencing any fatigue?:  Yes      Fatigue (0-10 scale):  5            EXAM      General: Alert, cooperative, no acute distress, cachectic and chronically ill-    appearing      Eyes: Anicteric sclera, PERRLA      Respiratory: CTAB, normal respiratory effort      Abdomen: Normal active bowel sounds, ostomy in place, no signs of infection or     other abnormality, abdomen slightly distended      Cardiovascular: RRR, no murmur, 1+ peripheral edema      Skin: Normal tone, no rash, no lesions      Psychiatric: Appropriate affect, intact judgment      Neurologic: No focal sensory or motor deficits, no weakness, numbness, dizziness      Musculoskeletal: Normal muscle strength, normal muscle tone      Extremities: No clubbing, cyanosis, or deformities            PREVENTION      Hx Influenza Vaccination:  No      Influenza Vaccine Declined:  Yes      2 or More Falls in Past Year?:  No      Fall Past Year with Injury?:  No      Encouraged to follow-up with:  PCP regarding preventative exams.      Chart initiated by      JO-ANN BROWN MA            ALLERGY/MEDS      Allergies      Coded Allergies:             NO KNOWN ALLERGIES (Verified , 11/23/20)            Medications      Last Reconciled on 12/7/20 13:12 by FADIA CENTENO      Mirtazapine (Mirtazapine*) 15 Mg Tablet      15 MG PO HS for 30 Days, #30 TAB 5 Refills         Prov: FADIA CENTENO         11/24/20       Morphine Sulfate ER (Morphine Sulfate ER) 15 Mg Tablet.er      15 MG PO Q8, #90 TAB         Prov: FADIA CENTENO         11/24/20       HYDROcodone-Acetaminophen 5-325 Mg (HYDROcodone-Acetaminophen 5-325 Mg) 1 Each     Tablet      1-2 TAB PO Q4H PRN for PAIN, #60 TAB         Prov: FADIA CENTENO         11/24/20       Doxycycline Monohydrate (Doxycycline Monohydrate) 100 Mg Capsule      100 MG PO BID for 7 Days, #14 CAP         Prov: FADIA CENTENO         11/23/20       Polyethylene Glycol  (Miralax*) 17 Gm Packet      17 GM PO QDAY, #30 PKT 0 Refills         Prov: FADIA CENTENO         9/14/20       Loperamide (Loperamide) 2 Mg Capsule      2 MG PO ASDIR, #60 TAB 5 Refills         Prov: FADIA CENTENO         8/25/20       Ondansetron Hcl (ONDANSETRON HCL) 8 Mg Tablet      8 MG PO Q8H PRN for NAUSEA, #30 TAB 3 Refills         Prov: FADIA CENTENO         8/20/20       Prochlorperazine (Compazine) 5 Mg Tab      10 MG PO TID for NAUSEA, #30 TAB 3 Refills         Prov: FADIA CENTENO         8/20/20       Zinc Oxide 20% Ointment (Zinc Oxide 20% Ointment) 28.35 Gm Oint...g.      1 APL TOPICAL TID for 30 Days, #1 TUBE         Prov: Billy Murray         8/18/20       Docusate Sodium (Docusate Sodium) 100 Mg Capsule      100 MG PO QDAY, CAP         Reported         8/7/20      Medications Reviewed:  No Changes made to meds            IMPRESSION/PLAN      Diagnosis      Rectal cancer - C20            Notes      New Medications      * Doxycycline Monohydrate 100 MG CAPSULE: 100 MG PO BID 7 Days #14         Dx: Rectal cancer - C20      * HYDROcodone-Acetaminophen 5-325 Mg 1 EACH TABLET: 1-2 TAB PO Q4H PRN PAIN #60         Dx: S/P colostomy - Z93.3      * Morphine Sulfate ER 15 MG TABLET.ER: 15 MG PO Q8 #90      * Mirtazapine (Mirtazapine*) 15 MG TABLET: 15 MG PO HS 30 Days #30      New Referrals      * Radiation Therapy, Routine         [facility]         Reason for Referral: evaluate for RT options for increased pain.         Dx: Rectal cancer - C20      * Surgery, Routine         JOY AMADOR MD         Reason for Referral: evaluate for increased pain and bleeding         Dx: Rectal cancer - C20            Plan      Rectal adenocarcinoma: Patient has at least stage IIIc disease with extension of    the mass throughout her pelvis and into the vaginal wall and urinary tract.  She    underwent diverting ostomy prior to the start of chemotherapy.  Recent CT CAP     shows a new 6 mm pulmonary nodule of  unclear significance but the primary mass     has improved significantly.  It is concerning that she has worsening pain over     the past 2 days.  We will hold cycle 7 of FOLFOX today due to worsening LFTs.  I    will refer her to Dr. Garay for consideration of radiation therapy.  I will     also refer her to Dr. Granados to reevaluate given her worsening pain.            Rectal pain: Due to underlying malignancy.  We will refill patient's MS Contin     q8 hours and refill her oxycodone as currently written.            Face rash: Likely secondary to chemotherapy.  We will start doxycycline for     actiniform rash.            Transaminitis: Patient's AST and ALT have increased.  We will hold chemotherapy     as above.  She will return to clinic in 2 weeks for repeat labs and will     hopefully get cycle 7.            Malnutrition: Patient is getting supplemental nutrition.  Her weight is stable.     During the appointment she initially declined an appetite stimulating medication    again.  However after she spoke with nutrition she decided to pursue this.  I     will send a prescription for mirtazapine today.            Patient Education      Patient Education Provided:  Yes            Electronically signed by FADIA CENTENO  12/07/2020 13:12       Disclaimer: Converted document may not contain table formatting or lab diagrams. Please see [employer] for the authenticated document.

## 2021-05-28 NOTE — PROGRESS NOTES
Patient: ROBERTO CARLOS LANE     Acct: [id number]     Report: #[id number]  UNIT #: [id number]     : 1955    Encounter Date:2021  PRIMARY CARE:   ***Signed***  --------------------------------------------------------------------------------------------------------------------  NURSE INTAKE      Visit Type      Established Patient Visit            Chief Complaint      RECTAL CA            History and Present Illness      Past Oncology Illness History      Locally Advanced Rectal cancer:             - Referred to me by Dr. Silvia Granados.       - CT of the abdomen/pelvis on 7/10/2020 showed 1) diffuse wall thickening in the    distal rectum.  Heterogeneous enhancement of the perineum.  2) inguinal     adenopathy measuring up to 2.5 cm.  3) 3 mm noncalcified right lower lobe     nodule.  4) wall thickening in the fundus of the gallbladder with faint     calcification.  Focal adenomatosis is favored over neoplasm.  5) intrahepatic     and extrahepatic biliary duct dilation.      - Biopsy of anal mass on 2020.  Pathology was positive for adenocarcinoma,     poorly differentiated.      - CT of the chest on 2020 showed a 3 mm nodule in the right lower lobe and     a 3 mm nodule in the left upper lobe.      - diverting colostomy on 2020 complicated by small bowel obstruction due to    adhesions, hospitalized from 2020 to 2020.      - MRI pelvis 2020: 1. Locally advanced low rectal cancer with significant     involvement of the anal sphincteric complex and localized invasion of the     vagina.  Findings are compatible with T4b tumor. 2. Bulbous appearance of the     urethra raises question of possible urethral involvement with tumor. 3. B    ilateral inguinal adenopathy compatible with ryne involvement. 4. Nonspecific     pelvic subcutaneous edema and vulvar edema.             Cycle 1-5 of FOLFOX from 2020 to 10/26/2020             CT CAP on 2020: New 6 mm noncalcified  nodule in the right lower lobe     suspicious for metastatic disease.  Mild splenomegaly.  Moderate intra and     extrahepatic bile duct dilation unchanged.  Ill-defined mass in the rectum and     perineum is smaller.  This mass previously measured 5.6 cm x 7.5 cm and now     measures 5.2 cm x 4 cm, indicating it has shrunk by at least half.            Cycle 6: 11/9/2020             Chemotherapy held on 11/23/2020 due to elevated AST/ALT and worsening pain.  She    is having worsening progression of local disease despite CT scan showing     improvement in the size of the primary mass.             Cycle 7: 12/7/20 (10% reduction in 5FU due to prior transaminitis)             Started 5-FU with concurrent radiation to the rectum and anal canal on     12/21/2020.  Second week of 5-FU/leucovorin on 1/18/2021            Cycle 1 FOLFIRI on 3/9/2021            HPI - Oncology Interim      The patient comes in today for her next treatment.  She has gained weight due to    edema but her face looks even thinner.  Her Hgb is down further to 8.7. She also    has more severe pain in her legs and is unable to sit or walk without pain.            Clinical Staging      At least cT4bN2 disease, stage IIIc.      Pulmonary nodules too small to characterize.            ECOG Performance Status      2            Most Recent Lab Findings      Laboratory Tests      5/18/21 09:00            5/18/21 09:07            Laboratory Tests            Test       5/18/21      09:00             Magnesium Level       2.17 mg/dL      (1.60-2.30)            PAST, FAMILY   Past Medical History      Past Medical History:  None      Hematology/Oncology (F):  None      Other Hematology History:        Rectal cancer.      Genetic/Metabolic:  None            Past Surgical History      Appendectomy, Biopsy, Hysterectomy            Diverting ostomy            Family History      Family History:  Lung Cancer            Social History      Lives independently:  Yes       Number of Children:  2      Occupation:  RETIRED            Tobacco Use      Tobacco status:  Never smoker            Substance Use      Substance use:  Denies use            REVIEW OF SYSTEMS      General:  Admits: Fatigue;          Denies: Appetite Change, Fever, Night Sweats, Weight Gain, Weight Loss      Eye:  Denies Blurred Vision, Denies Corrective Lenses, Denies Diplopia, Denies     Vision Changes      ENT:  Denies Headache, Denies Hearing Loss, Denies Hoarseness, Denies Sore     Throat      Cardiovascular:  Denies Chest Pain, Denies Palpitations      Respiratory:  Denies: Cough, Coughing Blood, Productive Cough, Shortness of Air,    Wheezing      Gastrointestinal:  Denies Bloody Stools, Denies Constipation, Denies Diarrhea,     Denies Nausea/Vomiting, Denies Problem Swallowing, Denies Unable to Control     Bowels      Genitourinary:  Denies Blood in Urine, Denies Incontinence, Denies Painful     Urination      Musculoskeletal:  Denies Back Pain, Denies Muscle Pain; Admits Painful Joints      Integumentary:  Denies Itching, Denies Lesions, Denies Rash      Neurologic:  Denies Dizziness, Denies Numbness\Tingling, Denies Seizures      Psychiatric:  Denies Anxiety, Denies Depression      Endocrine:  Denies Cold Intolerance, Denies Heat Intolerance      Hematologic/Lymphatic:  Denies Bruising, Denies Bleeding, Denies Enlarged Lymph     Nodes      Reproductive:  Denies: Menopause, Heavy Periods, Pregnant, Still Menstruating            VITAL SIGNS AND SCORES      Vitals      Height 5 ft 5.24 in / 165.7 cm      Weight 153 lbs 3.515 oz / 69.5 kg      BSA 1.77 m2      BMI 25.3 kg/m2      Temperature 98.3 F / 36.83 C - Temporal      Pulse 95      Respirations 20      Blood Pressure 117/67 Sitting      Pulse Oximetry 97%, RM AIR            Pain Score      Experiencing any pain?:  Yes      Pain Scale Used:  Numerical      Pain Intensity:  10            Fatigue Score      Experiencing any fatigue?:  Yes      Fatigue  (0-10 scale):  10            EXAM      General: Alert, cooperative, appears to be in significant pain      Eyes: Anicteric sclera, PERRLA      Respiratory: CTAB, normal respiratory effort      Cardiovascular: 3+ lower extremity edema      Skin: thickening of the skin of the right leg is worse      Psychiatric: Appropriate affect, intact judgment      Neurologic: No focal sensory or motor deficits, no weakness, numbness, dizziness      Extremities: No clubbing, cyanosis, or deformities            PREVENTION      Hx Influenza Vaccination:  No      Influenza Vaccine Declined:  Yes      2 or More Falls in Past Year?:  No      Fall Past Year with Injury?:  No      Hx Pneumococcal Vaccination:  No      Encouraged to follow-up with:  PCP regarding preventative exams.      Chart initiated by      ROXANNE COREY CMA            ALLERGY/MEDS      Allergies      Coded Allergies:             NO KNOWN ALLERGIES (Verified , 5/18/21)            Medications      Last Reconciled on 5/4/21 14:28 by FADIA CENTENO      Ondansetron Hcl (ONDANSETRON HCL) 8 Mg Tablet      8 MG PO Q8H PRN for NAUSEA, #30 TAB 3 Refills         Prov: FADIA CENTENO         4/30/21       Prochlorperazine (Compazine) 5 Mg Tab      10 MG PO TID for NAUSEA, #30 TAB 3 Refills         Prov: FADIA CENTENO         4/30/21       Gabapentin (Gabapentin) 300 Mg Capsule      600 MG PO TID for 30 Days, #180 CAP 5 Refills         Prov: FADIA CENTENO         4/20/21       Morphine Sulfate ER (Morphine Sulfate ER) 100 Mg Tablet.er      100 MG PO Q12HR, #60 TAB.SR         Prov: FADIA CENTENO         4/20/21       oxyCODONE IR (oxyCODONE IR) 10 Mg Tablet      10 MG PO Q3H PRN for PAIN, #120 TAB         Prov: FADIA CENTENO         4/20/21       Lidocaine (Lidocaine HCl Viscous) 20 Mg/Ml Solution      5 ML TOPICAL TID PRN for DISCOMFORT, #100 ML 3 Refills         Prov: FADIA CENTENO         4/6/21       oxyCODONE IR (oxyCODONE IR) 5 Mg Tablet      10 MG PO Q3H PRN for PAIN  for 20 Days, #60 TAB 0 Refills         Prov: [facility]         2/16/21       Mirtazapine (Mirtazapine*) 15 Mg Tablet      15 MG PO HS for 30 Days, #30 TAB 5 Refills         Prov: FADIA CENTENO         11/24/20       Polyethylene Glycol (Miralax*) 17 Gm Packet      17 GM PO QDAY, #30 PKT 0 Refills         Prov: FADIA CENTENO         9/14/20       Loperamide (Loperamide) 2 Mg Capsule      2 MG PO ASDIR, #60 TAB 5 Refills         Prov: FADIA CENTENO         8/25/20       Docusate Sodium (Docusate Sodium) 100 Mg Capsule      100 MG PO QDAY, CAP         Reported         8/7/20      Medications Reviewed:  No Changes made to meds            IMPRESSION/PLAN      Diagnosis      Lymphedema of right lower extremity - I89.0            Plan      Rectal cancer: patient has extensive local disease and was initially treated     with 6 cycles of FOLFOX.  The primary tumor location did respond to some degree     but she had some progression in the labial mass. She then underwent concurrent     chemoRT with 5FU/Lv.  Unfortunately her cancer progressed further and she     appeared to have dermal metastatsis along the right thigh. After radiation she     was started on FOLFIRI and is tolerating it well but clinically is worsening.      She is unable to sit due to pain. I recommend she discontinue this therapy and I    will send a prescription for regorafinib.  We also discussed hospice vs     palliative care.  The patient will consider palliative care and would also like     home health.             Weakness and deconditioning: As above, the patient is now suffering more pain     and has limited mobility. She is having difficulty getting to the bathroom on     her own and achieving her own ADLs.  She will need a walker with a seat because     a cane does not provide enough stability or opportunity for rest.  With a walker    with a seat she will be able to more safely move around her house and overcome     some of these  deficits.              Lymphedema: likely secondary to dermal metastasis. Still worsening. Initially     recieved benefit from lymphedema therapy but her right leg has worsened again.      Will defer to Yoli in OT for treatment.              Pain: Rectal pain improve but lower ext pain is worse due to edema and dermal     mets.  Patient symptoms are tolerable on 30 mg MS Contin at night and oxycodone     10 mg q4 hour PRN. She also takes gabapentin 600mg TID.            Patient Education      Patient Education Provided:  Yes            Electronically signed by FADIA CENTENO  05/21/2021 16:25       Disclaimer: Converted document may not contain table formatting or lab diagrams. Please see [employer] for the authenticated document.

## 2021-06-07 ENCOUNTER — APPOINTMENT (OUTPATIENT)
Dept: RADIATION ONCOLOGY | Facility: HOSPITAL | Age: 66
End: 2021-06-07

## 2022-08-24 NOTE — H&P
History and Physical      Patient Name: Yao Mcclure   Patient ID: [id number]   Sex: Female   YOB: 1955        Visit Date: July 13, 2020    Provider: Silvia Teresa MD   [facility]   Location Address: [address] [place] [address]  [id number]   Location Phone: [phone]          Chief Complaint  · Pre-Surgical History and Physical Examination for [facility]  · Consents for Surgery      History Of Present Illness     Very pleasant lady who has been being treated for hemorrhoids presented to ER with pain and she was found to have a extruding rectal mass .       Past Medical History  Allergies; Hemorrhoids         Medication List  ibuprofen oral; multivitamin oral tablet; Super B Complex oral         Allergy List  NO KNOWN DRUG ALLERGIES       Allergies Reconciled  Social History  Tobacco (Never)         Review of Systems  · Constitutional  o Denies  o : fever, chills  · Eyes  o Denies  o : yellowish discoloration of the eyes, eye pain  · HENT  o Denies  o : difficulty swallowing, hoarseness  · Cardiovascular  o Denies  o : chest pain on exertion, irregular heart beats  · Respiratory  o Denies  o : shortness of breath, cough  · Gastrointestinal  o Denies  o : nausea, vomiting, diarrhea, constipation  · Integument  o Denies  o : rash, Skin Lesion or Lump  · Neurologic  o Denies  o : tingling or numbness, loss of balance  · Musculoskeletal  o Denies  o : joint pain, back pain  · Endocrine  o Denies  o : weight gain, weight loss      Physical Examination  · Constitutional  o Appearance  o : well developed/well nourished patient in no apparent distress  · Respiratory  o Inspection of Chest  o : equal breaths bilaterally  · Gastrointestinal  o Abdominal Examination  o : soft/nontender, nondistended, no organomegaly appreciated     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 AF.   resolved     cancer     154.3/C21.0      Plan  · Orders  o General Surgery Order (GENOR) - V72.83, 154.3/C21.0 - 07/14/2020  o [facility] Pre-Op Covid-19 Screening (06933) - V72.83, 154.3/C21.0 - 07/13/2020  · Medications  o Medications have been Reconciled  o Transition of Care or Provider Policy  · Instructions  o Handouts Provided-Pre-Procedure Instructions including date and time and location of procedure.  o ****Surgical Orders****  o ****Patient Status****  o RISK AND BENEFITS:  o Consent for surgery: Given these options, the patient has verbally expressed an understanding of the risks of surgery and finds these risks acceptable. We will proceed with surgery as soon as possible.  o Possible risks, complications, benefits, and alternatives to surgical or invasive procedure have been explained to patient and/or legal guardian.  o O.R. PREP: Per protocol  o IV: Per Anesthesia  o Please sign permit for: Exam under anesthesia with biopsy of anal mass  o Kefzol 2 grams IV on call to OR.  o The above History and Physical Examination has been completed within 30 days of admission.            Electronically Signed by: Silvia Teresa MD -Author on July 13, 2020 10:19:43 AM

## 2022-08-25 ENCOUNTER — TELEPHONE (OUTPATIENT)
Dept: ONCOLOGY | Facility: HOSPITAL | Age: 67
End: 2022-08-25

## 2022-08-25 NOTE — TELEPHONE ENCOUNTER
Caller: Rodrigo    Relationship:     Best call back number: [phone]    Who are you requesting to speak with (clinical staff, provider,  specific staff member): CLINICAL    What was the call regarding: ROBERTO CARLOS HAD A CARIS TEST AND SHE MAY QUALIFY FOR A CLINICAL TRIAL, SHE IS WANTING TO SPEAK TO SOMEONE FOR MORE INFORMATION    Do you require a callback: YES